# Patient Record
Sex: FEMALE | Race: WHITE
[De-identification: names, ages, dates, MRNs, and addresses within clinical notes are randomized per-mention and may not be internally consistent; named-entity substitution may affect disease eponyms.]

---

## 2017-09-13 ENCOUNTER — HOSPITAL ENCOUNTER (EMERGENCY)
Dept: HOSPITAL 25 - ED | Age: 82
Discharge: HOME | End: 2017-09-13
Payer: MEDICARE

## 2017-09-13 VITALS — SYSTOLIC BLOOD PRESSURE: 118 MMHG | DIASTOLIC BLOOD PRESSURE: 57 MMHG

## 2017-09-13 DIAGNOSIS — R05: ICD-10-CM

## 2017-09-13 DIAGNOSIS — F17.210: ICD-10-CM

## 2017-09-13 DIAGNOSIS — R06.2: ICD-10-CM

## 2017-09-13 DIAGNOSIS — J40: Primary | ICD-10-CM

## 2017-09-13 LAB
ALBUMIN SERPL BCG-MCNC: 3.5 G/DL (ref 3.2–5.2)
ALP SERPL-CCNC: 68 U/L (ref 34–104)
ALT SERPL W P-5'-P-CCNC: 11 U/L (ref 7–52)
ANION GAP SERPL CALC-SCNC: 7 MMOL/L (ref 2–11)
AST SERPL-CCNC: 12 U/L (ref 13–39)
BUN SERPL-MCNC: 16 MG/DL (ref 6–24)
BUN/CREAT SERPL: 22.9 (ref 8–20)
CALCIUM SERPL-MCNC: 9.3 MG/DL (ref 8.6–10.3)
CHLORIDE SERPL-SCNC: 100 MMOL/L (ref 101–111)
GLOBULIN SER CALC-MCNC: 3.5 G/DL (ref 2–4)
GLUCOSE SERPL-MCNC: 124 MG/DL (ref 70–100)
HCO3 SERPL-SCNC: 24 MMOL/L (ref 22–32)
HCT VFR BLD AUTO: 36 % (ref 35–47)
HGB BLD-MCNC: 11.5 G/DL (ref 12–16)
MCH RBC QN AUTO: 28 PG (ref 27–31)
MCHC RBC AUTO-ENTMCNC: 33 G/DL (ref 31–36)
MCV RBC AUTO: 86 FL (ref 80–97)
POTASSIUM SERPL-SCNC: 3.4 MMOL/L (ref 3.5–5)
PROT SERPL-MCNC: 7 G/DL (ref 6.4–8.9)
RBC # BLD AUTO: 4.12 10^6/UL (ref 4–5.4)
SODIUM SERPL-SCNC: 131 MMOL/L (ref 133–145)
TROPONIN I SERPL-MCNC: 0 NG/ML (ref ?–0.04)
WBC # BLD AUTO: 11.7 10^3/UL (ref 3.5–10.8)

## 2017-09-13 PROCEDURE — 83605 ASSAY OF LACTIC ACID: CPT

## 2017-09-13 PROCEDURE — 85025 COMPLETE CBC W/AUTO DIFF WBC: CPT

## 2017-09-13 PROCEDURE — 36415 COLL VENOUS BLD VENIPUNCTURE: CPT

## 2017-09-13 PROCEDURE — 84484 ASSAY OF TROPONIN QUANT: CPT

## 2017-09-13 PROCEDURE — 83880 ASSAY OF NATRIURETIC PEPTIDE: CPT

## 2017-09-13 PROCEDURE — 93005 ELECTROCARDIOGRAM TRACING: CPT

## 2017-09-13 PROCEDURE — 80053 COMPREHEN METABOLIC PANEL: CPT

## 2017-09-13 PROCEDURE — 99283 EMERGENCY DEPT VISIT LOW MDM: CPT

## 2017-09-13 PROCEDURE — 71010: CPT

## 2017-09-13 NOTE — RAD
INDICATION: Short of breath     



COMPARISON: September 14, 2014

 

TECHNIQUE: An AP portable view obtained at 2117 hours is submitted.



FINDINGS: 



Bones/Soft Tissues: There are no acute bony findings. There is a scoliotic deformity.



Cardiomediastinal: The cardiomediastinal silhouette is normal. 



Lungs: There is hyperinflation with mild chronic interstitial change.



Pleura: There are no pleural effusions. 



Other: None



IMPRESSION:  HYPERINFLATION WITH MILD CHRONIC INTERSTITIAL CHANGE. NO ACUTE FINDINGS.

## 2017-09-14 ENCOUNTER — HOSPITAL ENCOUNTER (INPATIENT)
Dept: HOSPITAL 25 - ED | Age: 82
LOS: 3 days | Discharge: HOME | DRG: 189 | End: 2017-09-17
Attending: HOSPITALIST | Admitting: HOSPITALIST
Payer: MEDICARE

## 2017-09-14 DIAGNOSIS — J18.9: ICD-10-CM

## 2017-09-14 DIAGNOSIS — Z82.8: ICD-10-CM

## 2017-09-14 DIAGNOSIS — F17.210: ICD-10-CM

## 2017-09-14 DIAGNOSIS — J44.1: ICD-10-CM

## 2017-09-14 DIAGNOSIS — J96.01: Primary | ICD-10-CM

## 2017-09-14 DIAGNOSIS — F41.9: ICD-10-CM

## 2017-09-14 DIAGNOSIS — Z82.49: ICD-10-CM

## 2017-09-14 DIAGNOSIS — Z79.899: ICD-10-CM

## 2017-09-14 DIAGNOSIS — G47.00: ICD-10-CM

## 2017-09-14 DIAGNOSIS — Z81.1: ICD-10-CM

## 2017-09-14 LAB
ALBUMIN SERPL BCG-MCNC: 3.3 G/DL (ref 3.2–5.2)
ALP SERPL-CCNC: 59 U/L (ref 34–104)
ALT SERPL W P-5'-P-CCNC: 12 U/L (ref 7–52)
ANION GAP SERPL CALC-SCNC: 9 MMOL/L (ref 2–11)
AST SERPL-CCNC: 14 U/L (ref 13–39)
BUN SERPL-MCNC: 15 MG/DL (ref 6–24)
BUN/CREAT SERPL: 20.8 (ref 8–20)
CALCIUM SERPL-MCNC: 8.6 MG/DL (ref 8.6–10.3)
CHLORIDE SERPL-SCNC: 101 MMOL/L (ref 101–111)
GLOBULIN SER CALC-MCNC: 3.4 G/DL (ref 2–4)
GLUCOSE SERPL-MCNC: 112 MG/DL (ref 70–100)
HCO3 SERPL-SCNC: 24 MMOL/L (ref 22–32)
HCT VFR BLD AUTO: 34 % (ref 35–47)
HGB BLD-MCNC: 11.1 G/DL (ref 12–16)
MCH RBC QN AUTO: 28 PG (ref 27–31)
MCHC RBC AUTO-ENTMCNC: 33 G/DL (ref 31–36)
MCV RBC AUTO: 87 FL (ref 80–97)
POTASSIUM SERPL-SCNC: 3.8 MMOL/L (ref 3.5–5)
PROT SERPL-MCNC: 6.7 G/DL (ref 6.4–8.9)
RBC # BLD AUTO: 3.91 10^6/UL (ref 4–5.4)
SODIUM SERPL-SCNC: 134 MMOL/L (ref 133–145)
TROPONIN I SERPL-MCNC: 0.01 NG/ML (ref ?–0.04)
WBC # BLD AUTO: 11.5 10^3/UL (ref 3.5–10.8)

## 2017-09-14 PROCEDURE — 71020: CPT

## 2017-09-14 PROCEDURE — 87040 BLOOD CULTURE FOR BACTERIA: CPT

## 2017-09-14 PROCEDURE — 87502 INFLUENZA DNA AMP PROBE: CPT

## 2017-09-14 PROCEDURE — 87899 AGENT NOS ASSAY W/OPTIC: CPT

## 2017-09-14 PROCEDURE — 83880 ASSAY OF NATRIURETIC PEPTIDE: CPT

## 2017-09-14 PROCEDURE — 90686 IIV4 VACC NO PRSV 0.5 ML IM: CPT

## 2017-09-14 PROCEDURE — 87070 CULTURE OTHR SPECIMN AEROBIC: CPT

## 2017-09-14 PROCEDURE — 83605 ASSAY OF LACTIC ACID: CPT

## 2017-09-14 PROCEDURE — 85730 THROMBOPLASTIN TIME PARTIAL: CPT

## 2017-09-14 PROCEDURE — 99406 BEHAV CHNG SMOKING 3-10 MIN: CPT

## 2017-09-14 PROCEDURE — 36415 COLL VENOUS BLD VENIPUNCTURE: CPT

## 2017-09-14 PROCEDURE — 94760 N-INVAS EAR/PLS OXIMETRY 1: CPT

## 2017-09-14 PROCEDURE — 80053 COMPREHEN METABOLIC PANEL: CPT

## 2017-09-14 PROCEDURE — 84484 ASSAY OF TROPONIN QUANT: CPT

## 2017-09-14 PROCEDURE — 94640 AIRWAY INHALATION TREATMENT: CPT

## 2017-09-14 PROCEDURE — 93005 ELECTROCARDIOGRAM TRACING: CPT

## 2017-09-14 PROCEDURE — 85610 PROTHROMBIN TIME: CPT

## 2017-09-14 PROCEDURE — 85025 COMPLETE CBC W/AUTO DIFF WBC: CPT

## 2017-09-14 PROCEDURE — 87205 SMEAR GRAM STAIN: CPT

## 2017-09-14 PROCEDURE — 71010: CPT

## 2017-09-14 RX ADMIN — SODIUM CHLORIDE ONE MLS/HR: 900 IRRIGANT IRRIGATION at 21:01

## 2017-09-14 RX ADMIN — SODIUM CHLORIDE ONE MLS/HR: 900 IRRIGANT IRRIGATION at 23:15

## 2017-09-14 NOTE — ED
Corey ZAMAN Thomas, scribed for Martell Shields MD on 09/13/17 at 2058 .





Shortness of Breath





- HPI Summary


HPI Summary: 





The patient is a 84 y/o F with a Hx of pulmonary problems who presents to the 

ED c/o SOB that began two days ago. She takes Symbicort and albuterol at home, 

which have not relieved her SOB. She additionally c/o a productive cough (mucus 

production) and weakness. She denies CP and pedal edema. She also takes Ambien 

at night. She sees Dr. Gonzales for her pulmonary problems. She says she quit 

smoking about a month ago although she [place] [facility] [place]   





- History of Current Complaint


Chief Complaint: EDShortnessOfBreath


Time Seen by Provider: 09/13/17 20:42


Hx Obtained From: Patient


Onset/Duration: Lasting Days - onset two days ago, Still Present


Timing: Constant


Current Severity: Moderate


Dyspnea At: Rest


Aggrevating Factors: Nothing


Alleviating Factors: Nothing


Associated Signs & Symptoms: Cough (Productive) - mucus production


Related History: Similar Episode - She has similar prior episodes of SOB.





- Allergy/Home Medications


Allergies/Adverse Reactions: 


 Allergies











Allergy/AdvReac Type Severity Reaction Status Date / Time


 


No Known Allergies Allergy   Verified 09/13/17 20:14














PMH/Surg Hx/FS Hx/Imm Hx


Previously Healthy: No


Endocrine/Hematology History: 


   Denies: Hx Diabetes


Cardiovascular History: 


   Denies: Hx Congestive Heart Failure, Hx Hypertension


Respiratory History: Reports: Other Respiratory Problems/Disorders - current 

SOB with exertion


 History: 


   Denies: Hx Renal Disease





- Surgical History


Surgery Procedure, Year, and Place: gallbladder removal, appy, hysterectomy





- Immunization History


Date of Tetanus Vaccine: Unknown


Infectious Disease History: No


Infectious Disease History: 


   Denies: Traveled Outside the [place] in Last 30 Days





- Family History


Known Family History: Positive: Other - POS: DM, [place]





- Social History


Alcohol Use: None


Substance Use Type: Reports: None


Smoking Status (MU): Heavy Every Day Tobacco Smoker





Review of Systems


Negative: Fever


Negative: Chest Pain


Positive: Shortness Of Breath - onset two days ago, unrelieved by Symbicort and 

albuterol


Negative: Edema - legs


All Other Systems Reviewed And Are Negative: Yes





Physical Exam


Triage Information Reviewed: Yes


Vital Signs On Initial Exam: 


 Initial Vitals











Temp Pulse Resp BP Pulse Ox


 


 97.9 F   118   36   122/48   93 


 


 09/13/17 20:12  09/13/17 20:12  09/13/17 20:12  09/13/17 20:12  09/13/17 20:12











Vital Signs Reviewed: Yes


Appearance: Positive: Well-Appearing, No Pain Distress, Well-Nourished


Skin: Positive: Warm, Skin Color Reflects Adequate Perfusion, Dry


Head/Face: Positive: Normal Head/Face Inspection


Eyes: Positive: Normal


ENT: Positive: Normal ENT inspection


Neck: Positive: Supple, Nontender


Respiratory/Lung Sounds: Positive: Breath Sounds Present, Other - There are 

crackles at the right base.


Cardiovascular: Positive: RRR


Abdomen Description: Positive: Nontender, Soft


Bowel Sounds: Positive: Present


Musculoskeletal: Positive: Normal


Neurological: Positive: Normal


Psychiatric: Positive: Normal, Affect/Mood Appropriate





Diagnostics





- Vital Signs


 Vital Signs











  Temp Pulse Resp BP Pulse Ox


 


 09/13/17 20:15  97.9 F  118  36  122/48  93


 


 09/13/17 20:12  97.9 F  118  36  122/48  93














- Laboratory


Lab Results: 


 Lab Results











  09/13/17 09/13/17 09/13/17 Range/Units





  21:10 21:10 21:10 


 


WBC   11.7 H   (3.5-10.8)  10^3/ul


 


RBC   4.12   (4.0-5.4)  10^6/ul


 


Hgb   11.5 L   (12.0-16.0)  g/dl


 


Hct   36   (35-47)  %


 


MCV   86   (80-97)  fL


 


MCH   28   (27-31)  pg


 


MCHC   33   (31-36)  g/dl


 


RDW   13   (10.5-15)  %


 


Plt Count   240   (150-450)  10^3/ul


 


MPV   8   (7.4-10.4)  um3


 


Neut % (Auto)   67.4   (38-83)  %


 


Lymph % (Auto)   17.7 L   (25-47)  %


 


Mono % (Auto)   13.0 H   (1-9)  %


 


Eos % (Auto)   1.5   (0-6)  %


 


Baso % (Auto)   0.4   (0-2)  %


 


Absolute Neuts (auto)   7.9 H   (1.5-7.7)  10^3/ul


 


Absolute Lymphs (auto)   2.1   (1.0-4.8)  10^3/ul


 


Absolute Monos (auto)   1.5 H   (0-0.8)  10^3/ul


 


Absolute Eos (auto)   0.2   (0-0.6)  10^3/ul


 


Absolute Basos (auto)   0.1   (0-0.2)  10^3/ul


 


Absolute Nucleated RBC   0   10^3/ul


 


Nucleated RBC %   0   


 


Sodium    131 L  (133-145)  mmol/L


 


Potassium    3.4 L  (3.5-5.0)  mmol/L


 


Chloride    100 L  (101-111)  mmol/L


 


Carbon Dioxide    24  (22-32)  mmol/L


 


Anion Gap    7  (2-11)  mmol/L


 


BUN    16  (6-24)  mg/dL


 


Creatinine    0.70  (0.51-0.95)  mg/dL


 


Est GFR ( Amer)    102.3  (>60)  


 


Est GFR (Non-Af Amer)    79.5  (>60)  


 


BUN/Creatinine Ratio    22.9 H  (8-20)  


 


Glucose    124 H  ()  mg/dL


 


Lactic Acid     (0.5-2.0)  mmol/L


 


Calcium    9.3  (8.6-10.3)  mg/dL


 


Total Bilirubin    0.50  (0.2-1.0)  mg/dL


 


AST    12 L  (13-39)  U/L


 


ALT    11  (7-52)  U/L


 


Alkaline Phosphatase    68  ()  U/L


 


Troponin I    0.00  (<0.04)  ng/mL


 


B-Natriuretic Peptide  54   ( - 100) pg/mL


 


Total Protein    7.0  (6.4-8.9)  g/dL


 


Albumin    3.5  (3.2-5.2)  g/dL


 


Globulin    3.5  (2-4)  g/dL


 


Albumin/Globulin Ratio    1.0  (1-3)  














  09/13/17 Range/Units





  21:10 


 


WBC   (3.5-10.8)  10^3/ul


 


RBC   (4.0-5.4)  10^6/ul


 


Hgb   (12.0-16.0)  g/dl


 


Hct   (35-47)  %


 


MCV   (80-97)  fL


 


MCH   (27-31)  pg


 


MCHC   (31-36)  g/dl


 


RDW   (10.5-15)  %


 


Plt Count   (150-450)  10^3/ul


 


MPV   (7.4-10.4)  um3


 


Neut % (Auto)   (38-83)  %


 


Lymph % (Auto)   (25-47)  %


 


Mono % (Auto)   (1-9)  %


 


Eos % (Auto)   (0-6)  %


 


Baso % (Auto)   (0-2)  %


 


Absolute Neuts (auto)   (1.5-7.7)  10^3/ul


 


Absolute Lymphs (auto)   (1.0-4.8)  10^3/ul


 


Absolute Monos (auto)   (0-0.8)  10^3/ul


 


Absolute Eos (auto)   (0-0.6)  10^3/ul


 


Absolute Basos (auto)   (0-0.2)  10^3/ul


 


Absolute Nucleated RBC   10^3/ul


 


Nucleated RBC %   


 


Sodium   (133-145)  mmol/L


 


Potassium   (3.5-5.0)  mmol/L


 


Chloride   (101-111)  mmol/L


 


Carbon Dioxide   (22-32)  mmol/L


 


Anion Gap   (2-11)  mmol/L


 


BUN   (6-24)  mg/dL


 


Creatinine   (0.51-0.95)  mg/dL


 


Est GFR ( Amer)   (>60)  


 


Est GFR (Non-Af Amer)   (>60)  


 


BUN/Creatinine Ratio   (8-20)  


 


Glucose   ()  mg/dL


 


Lactic Acid  1.2  (0.5-2.0)  mmol/L


 


Calcium   (8.6-10.3)  mg/dL


 


Total Bilirubin   (0.2-1.0)  mg/dL


 


AST   (13-39)  U/L


 


ALT   (7-52)  U/L


 


Alkaline Phosphatase   ()  U/L


 


Troponin I   (<0.04)  ng/mL


 


B-Natriuretic Peptide  ( - 100) pg/mL


 


Total Protein   (6.4-8.9)  g/dL


 


Albumin   (3.2-5.2)  g/dL


 


Globulin   (2-4)  g/dL


 


Albumin/Globulin Ratio   (1-3)  











Result Diagrams: 


 09/13/17 21:10





 09/13/17 21:10


Lab Statement: Any lab studies that have been ordered have been reviewed, and 

results considered in the medical decision making process.





- EKG


  ** 20:22


Cardiac Rate: Tachycardia - 100 BPM


EKG Interpretation: Sinus tachycardia. 





Course/Dx





- Course


Course Of Treatment: Ms Hinojosa clearly has pulmonary problems.  She is being W/

U'd and taken care of by .  She is a smoker.  She uses COPD meds.  

When she arrived, Her SPO2 was marginal, she was tachycardic and she was mildly 

tachypneic. I  heard no wheezes although she had just had a neb in the EMS.  I 

did hear a lot of upper airway sounds and crackles in her right base. A CXR was 

read as no acute pathology by the radiologist although I wasn't sure about the 

RLL. I wanted to treat her more aggressively with steroids and admission to the 

hospital but she wanted to go home as she felt better. She was still in the low 

90's for SPO2 and a little tachypneic at about 20.  She was no longer 

tachycardic.  She may have bronchiectasis and I am going to cover her with 

levaquin and encourage return for worsening and close F/U.





- Diagnoses


Provider Diagnoses: 


 Bronchitis








Discharge





- Discharge Plan


Condition: Stable


Disposition: HOME


Prescriptions: 


Levofloxacin TAB* [Levaquin TAB*] 750 mg PO DAILY #10 tab


Patient Education Materials:  Acute Bronchitis (ED)


Referrals: 


Mariela Cobb MD [Primary Care Provider] - 4 Days


Additional Instructions: 


Follow up with your primary care provider within a 4-5 days.  





Return to the emergency department for any new or worsening symptoms. 





The documentation as recorded by the Corey hollis Thomas accurately reflects 

the service I personally performed and the decisions made by me, Martell Shields MD.

## 2017-09-14 NOTE — RAD
INDICATION: Pneumonia     



COMPARISON: September 13, 2017

 

TECHNIQUE: PA and lateral dual-energy views were obtained.



FINDINGS: 



Bones/Soft Tissues: There are no acute bony findings.    



Cardiomediastinal: The cardiomediastinal silhouette is normal. 



Lungs: There is a small right basal infiltrate. The remaining lung fields are clear. There

is mild hyperinflation.



Pleura: There are no pleural effusions. 



Other: None



IMPRESSION:  SMALL RIGHT BASAL INFILTRATE.

## 2017-09-15 LAB
HCT VFR BLD AUTO: 34 % (ref 35–47)
HGB BLD-MCNC: 11.1 G/DL (ref 12–16)
MCH RBC QN AUTO: 29 PG (ref 27–31)
MCHC RBC AUTO-ENTMCNC: 33 G/DL (ref 31–36)
MCV RBC AUTO: 87 FL (ref 80–97)
RBC # BLD AUTO: 3.89 10^6/UL (ref 4–5.4)
WBC # BLD AUTO: 7.4 10^3/UL (ref 3.5–10.8)

## 2017-09-15 RX ADMIN — GUAIFENESIN SCH MG: 600 TABLET, EXTENDED RELEASE ORAL at 10:11

## 2017-09-15 RX ADMIN — ALBUTEROL SULFATE SCH: 2.5 SOLUTION RESPIRATORY (INHALATION) at 15:54

## 2017-09-15 RX ADMIN — MOMETASONE FUROATE AND FORMOTEROL FUMARATE DIHYDRATE SCH PUFF: 200; 5 AEROSOL RESPIRATORY (INHALATION) at 20:07

## 2017-09-15 RX ADMIN — ZOLPIDEM TARTRATE PRN MG: 10 TABLET, FILM COATED ORAL at 21:44

## 2017-09-15 RX ADMIN — DOCUSATE SODIUM SCH MG: 100 CAPSULE, LIQUID FILLED ORAL at 20:13

## 2017-09-15 RX ADMIN — OMEPRAZOLE SCH MG: 20 CAPSULE, DELAYED RELEASE ORAL at 06:15

## 2017-09-15 RX ADMIN — SODIUM CHLORIDE SCH MLS/HR: 900 IRRIGANT IRRIGATION at 05:30

## 2017-09-15 RX ADMIN — LEVOFLOXACIN SCH MLS/HR: 5 INJECTION, SOLUTION INTRAVENOUS at 06:14

## 2017-09-15 RX ADMIN — ALPRAZOLAM PRN MG: 0.25 TABLET ORAL at 17:50

## 2017-09-15 RX ADMIN — TIOTROPIUM BROMIDE SCH CAP: 18 CAPSULE ORAL; RESPIRATORY (INHALATION) at 08:39

## 2017-09-15 RX ADMIN — MOMETASONE FUROATE AND FORMOTEROL FUMARATE DIHYDRATE SCH PUFF: 200; 5 AEROSOL RESPIRATORY (INHALATION) at 08:39

## 2017-09-15 RX ADMIN — SODIUM CHLORIDE SCH MLS/HR: 900 IRRIGANT IRRIGATION at 20:20

## 2017-09-15 RX ADMIN — ALBUTEROL SULFATE SCH MG: 2.5 SOLUTION RESPIRATORY (INHALATION) at 08:38

## 2017-09-15 RX ADMIN — DOCUSATE SODIUM SCH MG: 100 CAPSULE, LIQUID FILLED ORAL at 10:12

## 2017-09-15 RX ADMIN — GUAIFENESIN SCH MG: 600 TABLET, EXTENDED RELEASE ORAL at 20:13

## 2017-09-15 NOTE — ED
Osmin ZAMAN Angela, scribed for Yolanda Sanchez MD on 09/14/17 at 2028 .





Shortness of Breath





- HPI Summary


HPI Summary: 





This pt is a 86 y/[facility] [facility] and SOB x2 

days ago, and now has increased SOB. Pt was in the ED yesterday and was given 

antibiotics for pneumonia. Pt states she has taken the Clarithromycin and 

Levaquin as prescribed, but she feels worse. Pt is a current smoker.





- History of Current Complaint


Chief Complaint: EDShortnessOfBreath


Time Seen by Provider: 09/14/17 20:06


Hx Obtained From: Patient, Family/Caretaker - 


Onset/Duration: Lasting Days


Timing: Constant


Dyspnea At: Exertion


Aggrevating Factors: Nothing


Alleviating Factors: Nothing





- Allergy/Home Medications


Allergies/Adverse Reactions: 


 Allergies











Allergy/AdvReac Type Severity Reaction Status Date / Time


 


No Known Allergies Allergy   Verified 09/14/17 20:42











Home Medications: 


 Home Medications





Clarithromycin TAB*  09/14/17 [History]











PMH/Surg Hx/FS Hx/Imm Hx


Endocrine/Hematology History: 


   Denies: Hx Diabetes


Cardiovascular History: 


   Denies: Hx Congestive Heart Failure, Hx Hypertension


Respiratory History: Reports: Other Respiratory Problems/Disorders - current 

SOB with exertion


 History: 


   Denies: Hx Renal Disease





- Surgical History


Surgery Procedure, Year, and Place: gallbladder removal, appy, hysterectomy





- Immunization History


Date of Tetanus Vaccine: Unknown


Infectious Disease History: No


Infectious Disease History: 


   Denies: Traveled Outside the [place] in Last 30 Days





- Family History


Known Family History: Positive: Other - POS: DM, [place]


   Negative: Hypertension





- Social History


Lives: With Family - 


Alcohol Use: None


Substance Use Type: Reports: None


Smoking Status (MU): Heavy Every Day Tobacco Smoker





Review of Systems


Negative: Fever, Chills


Eyes: Negative


ENT: Negative


Negative: Palpitations, Chest Pain


Positive: Shortness Of Breath


Gastrointestinal: Negative


Genitourinary: Negative


Musculoskeletal: Negative


Skin: Negative


Neurological: Negative


All Other Systems Reviewed And Are Negative: Yes





Physical Exam


Triage Information Reviewed: Yes


Vital Signs On Initial Exam: 


 Initial Vitals











Temp Pulse Resp BP Pulse Ox


 


 100 F   116   22   123/60   92 


 


 09/14/17 18:22  09/14/17 18:22  09/14/17 18:22  09/14/17 18:22  09/14/17 18:22











Vital Signs Reviewed: Yes


Appearance: Positive: No Pain Distress, Ill-Appearing


Skin: Positive: Warm, Skin Color Reflects Adequate Perfusion, Dry


Eyes: Positive: EOMI, BA


ENT: Positive: Pharynx normal, TMs normal


Neck: Positive: Supple, Nontender


Respiratory/Lung Sounds: Positive: Rhonchi - at the bases, Other - tachypnic 

but not in respiratory failure


Cardiovascular: Positive: RRR.  Negative: Murmur, Rub, Other - gallop


Abdomen Description: Positive: Nontender, Soft


Bowel Sounds: Positive: Present


Musculoskeletal: Positive: Strength/ROM Intact.  Negative: Edema Left, Edema 

Right


Neurological: Positive: Sensory/Motor Intact, Alert, Oriented to Person Place, 

Time, CN Intact II-III


Psychiatric: Positive: Affect/Mood Appropriate





Diagnostics





- Vital Signs


 Vital Signs











  Temp Pulse Resp BP Pulse Ox


 


 09/14/17 20:16  98.9 F  94  20  101/57  94


 


 09/14/17 19:12  101.6 F  111  18  107/57  94


 


 09/14/17 18:22  100 F  116  22  123/60  92














- Laboratory


Lab Results: 


 Lab Results











  09/14/17 09/14/17 09/14/17 Range/Units





  21:31 21:31 21:31 


 


WBC   11.5 H   (3.5-10.8)  10^3/ul


 


RBC   3.91 L   (4.0-5.4)  10^6/ul


 


Hgb   11.1 L   (12.0-16.0)  g/dl


 


Hct   34 L   (35-47)  %


 


MCV   87   (80-97)  fL


 


MCH   28   (27-31)  pg


 


MCHC   33   (31-36)  g/dl


 


RDW   13   (10.5-15)  %


 


Plt Count   231   (150-450)  10^3/ul


 


MPV   8   (7.4-10.4)  um3


 


Neut % (Auto)   71.1   (38-83)  %


 


Lymph % (Auto)   16.5 L   (25-47)  %


 


Mono % (Auto)   12.0 H   (1-9)  %


 


Eos % (Auto)   0.2   (0-6)  %


 


Baso % (Auto)   0.2   (0-2)  %


 


Absolute Neuts (auto)   8.2 H   (1.5-7.7)  10^3/ul


 


Absolute Lymphs (auto)   1.9   (1.0-4.8)  10^3/ul


 


Absolute Monos (auto)   1.4 H   (0-0.8)  10^3/ul


 


Absolute Eos (auto)   0   (0-0.6)  10^3/ul


 


Absolute Basos (auto)   0   (0-0.2)  10^3/ul


 


Absolute Nucleated RBC   0.01   10^3/ul


 


Nucleated RBC %   0   


 


INR (Anticoag Therapy)  1.43 H    (0.89-1.11)  


 


APTT  29.9    (26.0-36.3)  seconds


 


Sodium    134  (133-145)  mmol/L


 


Potassium    3.8  (3.5-5.0)  mmol/L


 


Chloride    101  (101-111)  mmol/L


 


Carbon Dioxide    24  (22-32)  mmol/L


 


Anion Gap    9  (2-11)  mmol/L


 


BUN    15  (6-24)  mg/dL


 


Creatinine    0.72  (0.51-0.95)  mg/dL


 


Est GFR ( Amer)    99.0  (>60)  


 


Est GFR (Non-Af Amer)    77.0  (>60)  


 


BUN/Creatinine Ratio    20.8 H  (8-20)  


 


Glucose    112 H  ()  mg/dL


 


Lactic Acid     (0.5-2.0)  mmol/L


 


Calcium    8.6  (8.6-10.3)  mg/dL


 


Total Bilirubin    0.60  (0.2-1.0)  mg/dL


 


AST    14  (13-39)  U/L


 


ALT    12  (7-52)  U/L


 


Alkaline Phosphatase    59  ()  U/L


 


Troponin I    0.01  (<0.04)  ng/mL


 


Total Protein    6.7  (6.4-8.9)  g/dL


 


Albumin    3.3  (3.2-5.2)  g/dL


 


Globulin    3.4  (2-4)  g/dL


 


Albumin/Globulin Ratio    1.0  (1-3)  














  09/14/17 Range/Units





  21:31 


 


WBC   (3.5-10.8)  10^3/ul


 


RBC   (4.0-5.4)  10^6/ul


 


Hgb   (12.0-16.0)  g/dl


 


Hct   (35-47)  %


 


MCV   (80-97)  fL


 


MCH   (27-31)  pg


 


MCHC   (31-36)  g/dl


 


RDW   (10.5-15)  %


 


Plt Count   (150-450)  10^3/ul


 


MPV   (7.4-10.4)  um3


 


Neut % (Auto)   (38-83)  %


 


Lymph % (Auto)   (25-47)  %


 


Mono % (Auto)   (1-9)  %


 


Eos % (Auto)   (0-6)  %


 


Baso % (Auto)   (0-2)  %


 


Absolute Neuts (auto)   (1.5-7.7)  10^3/ul


 


Absolute Lymphs (auto)   (1.0-4.8)  10^3/ul


 


Absolute Monos (auto)   (0-0.8)  10^3/ul


 


Absolute Eos (auto)   (0-0.6)  10^3/ul


 


Absolute Basos (auto)   (0-0.2)  10^3/ul


 


Absolute Nucleated RBC   10^3/ul


 


Nucleated RBC %   


 


INR (Anticoag Therapy)   (0.89-1.11)  


 


APTT   (26.0-36.3)  seconds


 


Sodium   (133-145)  mmol/L


 


Potassium   (3.5-5.0)  mmol/L


 


Chloride   (101-111)  mmol/L


 


Carbon Dioxide   (22-32)  mmol/L


 


Anion Gap   (2-11)  mmol/L


 


BUN   (6-24)  mg/dL


 


Creatinine   (0.51-0.95)  mg/dL


 


Est GFR ( Amer)   (>60)  


 


Est GFR (Non-Af Amer)   (>60)  


 


BUN/Creatinine Ratio   (8-20)  


 


Glucose   ()  mg/dL


 


Lactic Acid  1.4  (0.5-2.0)  mmol/L


 


Calcium   (8.6-10.3)  mg/dL


 


Total Bilirubin   (0.2-1.0)  mg/dL


 


AST   (13-39)  U/L


 


ALT   (7-52)  U/L


 


Alkaline Phosphatase   ()  U/L


 


Troponin I   (<0.04)  ng/mL


 


Total Protein   (6.4-8.9)  g/dL


 


Albumin   (3.2-5.2)  g/dL


 


Globulin   (2-4)  g/dL


 


Albumin/Globulin Ratio   (1-3)  











Result Diagrams: 


 09/14/17 21:31





 09/14/17 21:31


Lab Statement: Any lab studies that have been ordered have been reviewed, and 

results considered in the medical decision making process.





- Radiology


  ** Chest XR


Xray Interpretation: Positive (See Comments) - IMPRESSION: small right basal 

infiltrate. ED physician has reviewed this radiology report and agrees.


Radiology Interpretation Completed By: Radiologist





- EKG


  ** 2035


Cardiac Rate: NL


EKG Rhythm: Sinus Rhythm


EKG Comparison: No Significant Change - No change compared to yesterday's (9/13/ 16) EKG.





Course/Dx





- Course


Assessment/Plan: pt seen on Wed with likely pneumonia pt wanted to go home 

despite offering admission and is back today with worsened shortness of breath. 

cxr today does show an infiltrate,case was discussed with Frankenberg and she 

was accepted for admission





- Diagnoses


Differential Diagnosis/HQI/PQRI: Positive: Pneumonia


Provider Diagnoses: 


 Pneumonia, COPD (chronic obstructive pulmonary disease)








- Physician Notifications


Discussed Care of Patient With: Fred Frankenberg


Time Discussed With Above Provider: 20:40


Instructed by Provider To: Other - I discussed the pt's case with Dr. Frankenberg. He has agreed to admit the pt.





Discharge





- Discharge Plan


Condition: Stable


Disposition: ADMITTED TO [facility]


Referrals: 


Mariela Cobb MD [Primary Care Provider] - 





The documentation as recorded by the Osmin hollis Angela accurately reflects 

the service I personally performed and the decisions made by me, Yolanda Sanchez MD.

## 2017-09-15 NOTE — PN
Hospitalist Progress Note


HOSPITALIST ADDENDUM


Mrs. Hinojosa is an 86yo F with PMH of tobacco abuse and probable COPD, who 

presented to ED with c/o dyspnea and cough, found to have RLL pneumonia.


She offers no new complaints.


Agree with current management.

## 2017-09-15 NOTE — HP
H&P (Free Text)


History and Physical: 





PCP: ANTONINO Cobb MD





Date/Time: 09/15/2017 0300





CC: SOB, cough





HPI: Mrs Hinojosa is an 86YO [place] [facility] for SOB & cough 

with a negative evaluation and released with a prescription for clarithromycin 

& levofloxacin for suspected pneumnonia which she reports taking, but feels 

worse. She returns today for ongoing SOB & cough. She also has decreased 

appetite, generalized weakness, & malaise. She denies chest pain, N/V, 

palpitations, LE edema, or other issues. Repeat CXR today shows interval 

development of a RLL infiltrate.





PMedHx


insomnia





Ambulatory Orders


Zolpidem TAB* [Ambien*] 10 mg PO BEDTIME 08/07/14 


Clarithromycin TAB*  09/14/17 


Levofloxacin TAB* [Levaquin TAB*] 750 mg PO DAILY #10 tab 09/14/17 





Allergies


No Known Allergies Allergy (Verified 09/14/17 20:42)





PSurgHx


OU cataract extractions


cholecystectomy


appendectomy


hysterectomy





SocHx: 1PPD tobacco, no alcohol, no recreational drugs;  x2, lives with 

her son; full code status





FamHx: Mother passed at 87 2nd Alzheimer's. Father passed at 79 2nd 

complications of alcoholism. Siblings: pacer placement





ROS: as above, otherwise reviewed and all were negative





vitals: 


 Vital Signs











Temp  36.5 C   09/15/17 03:00


 


Pulse  73   09/15/17 03:03


 


Resp  29   09/15/17 03:03


 


BP  107/60   09/15/17 03:03


 


Pulse Ox  94   09/15/17 03:03








 Intake & Output











 09/14/17 09/14/17 09/15/17





 11:59 23:59 11:59


 


Intake Total   4000


 


Balance   4000


 


Weight  68.039 kg 


 


Intake:   


 


  IV Fluids   4000








Constitutional: NAD, normally developed, overweight elderly white female


HEENM: atraumatic; sclera/conjunctiva: non-icteric/clear; hearing: clinically 

mild/mod decreased; oropharynx: clear, mucosa moist


Neck: soft tissue: non-tender; thyroid: normal


Pulmonary: R basal crackle, B mid- to end- expiratory wheeze with prolonged 

expiratory phase; mildly diminished B, fair to good aeration, no accessory 

muscle use


CV: RR/RR, normal S1S2, no carotid bruit, no jugular venous distention, 2+ B DP/

PT, no edema


Abdominal: soft, non-distended, non-tender, no rebound/guarding/rigidity, 

normoactive bowel sounds, no hepatosplenomegaly or masses, no costovertebral 

angle tenderness


Musculoskeletal: general: grossly intact, no palpable tenderness


Integumental: normal appearance and texture of exposed skin


Psychiatric 


orientation: AA&O to PPS


affect: mildly anxious


mood: cooperative


eye contact: good


content: reliable


responses: timely


insight: fair





Testing: 


 Lab Results











  09/14/17 09/14/17 09/14/17 Range/Units





  21:31 21:31 21:31 


 


WBC   11.5 H   (3.5-10.8)  10^3/ul


 


RBC   3.91 L   (4.0-5.4)  10^6/ul


 


Hgb   11.1 L   (12.0-16.0)  g/dl


 


Hct   34 L   (35-47)  %


 


MCV   87   (80-97)  fL


 


MCH   28   (27-31)  pg


 


MCHC   33   (31-36)  g/dl


 


RDW   13   (10.5-15)  %


 


Plt Count   231   (150-450)  10^3/ul


 


MPV   8   (7.4-10.4)  um3


 


Neut % (Auto)   71.1   (38-83)  %


 


Lymph % (Auto)   16.5 L   (25-47)  %


 


Mono % (Auto)   12.0 H   (1-9)  %


 


Eos % (Auto)   0.2   (0-6)  %


 


Baso % (Auto)   0.2   (0-2)  %


 


Absolute Neuts (auto)   8.2 H   (1.5-7.7)  10^3/ul


 


Absolute Lymphs (auto)   1.9   (1.0-4.8)  10^3/ul


 


Absolute Monos (auto)   1.4 H   (0-0.8)  10^3/ul


 


Absolute Eos (auto)   0   (0-0.6)  10^3/ul


 


Absolute Basos (auto)   0   (0-0.2)  10^3/ul


 


Absolute Nucleated RBC   0.01   10^3/ul


 


Nucleated RBC %   0   


 


INR (Anticoag Therapy)  1.43 H    (0.89-1.11)  


 


APTT  29.9    (26.0-36.3)  seconds


 


Sodium    134  (133-145)  mmol/L


 


Potassium    3.8  (3.5-5.0)  mmol/L


 


Chloride    101  (101-111)  mmol/L


 


Carbon Dioxide    24  (22-32)  mmol/L


 


Anion Gap    9  (2-11)  mmol/L


 


BUN    15  (6-24)  mg/dL


 


Creatinine    0.72  (0.51-0.95)  mg/dL


 


Est GFR ( Amer)    99.0  (>60)  


 


Est GFR (Non-Af Amer)    77.0  (>60)  


 


BUN/Creatinine Ratio    20.8 H  (8-20)  


 


Glucose    112 H  ()  mg/dL


 


Lactic Acid     (0.5-2.0)  mmol/L


 


Calcium    8.6  (8.6-10.3)  mg/dL


 


Total Bilirubin    0.60  (0.2-1.0)  mg/dL


 


AST    14  (13-39)  U/L


 


ALT    12  (7-52)  U/L


 


Alkaline Phosphatase    59  ()  U/L


 


Troponin I    0.01  (<0.04)  ng/mL


 


Total Protein    6.7  (6.4-8.9)  g/dL


 


Albumin    3.3  (3.2-5.2)  g/dL


 


Globulin    3.4  (2-4)  g/dL


 


Albumin/Globulin Ratio    1.0  (1-3)  














  09/14/17 Range/Units





  21:31 


 


WBC   (3.5-10.8)  10^3/ul


 


RBC   (4.0-5.4)  10^6/ul


 


Hgb   (12.0-16.0)  g/dl


 


Hct   (35-47)  %


 


MCV   (80-97)  fL


 


MCH   (27-31)  pg


 


MCHC   (31-36)  g/dl


 


RDW   (10.5-15)  %


 


Plt Count   (150-450)  10^3/ul


 


MPV   (7.4-10.4)  um3


 


Neut % (Auto)   (38-83)  %


 


Lymph % (Auto)   (25-47)  %


 


Mono % (Auto)   (1-9)  %


 


Eos % (Auto)   (0-6)  %


 


Baso % (Auto)   (0-2)  %


 


Absolute Neuts (auto)   (1.5-7.7)  10^3/ul


 


Absolute Lymphs (auto)   (1.0-4.8)  10^3/ul


 


Absolute Monos (auto)   (0-0.8)  10^3/ul


 


Absolute Eos (auto)   (0-0.6)  10^3/ul


 


Absolute Basos (auto)   (0-0.2)  10^3/ul


 


Absolute Nucleated RBC   10^3/ul


 


Nucleated RBC %   


 


INR (Anticoag Therapy)   (0.89-1.11)  


 


APTT   (26.0-36.3)  seconds


 


Sodium   (133-145)  mmol/L


 


Potassium   (3.5-5.0)  mmol/L


 


Chloride   (101-111)  mmol/L


 


Carbon Dioxide   (22-32)  mmol/L


 


Anion Gap   (2-11)  mmol/L


 


BUN   (6-24)  mg/dL


 


Creatinine   (0.51-0.95)  mg/dL


 


Est GFR ( Amer)   (>60)  


 


Est GFR (Non-Af Amer)   (>60)  


 


BUN/Creatinine Ratio   (8-20)  


 


Glucose   ()  mg/dL


 


Lactic Acid  1.4  (0.5-2.0)  mmol/L


 


Calcium   (8.6-10.3)  mg/dL


 


Total Bilirubin   (0.2-1.0)  mg/dL


 


AST   (13-39)  U/L


 


ALT   (7-52)  U/L


 


Alkaline Phosphatase   ()  U/L


 


Troponin I   (<0.04)  ng/mL


 


Total Protein   (6.4-8.9)  g/dL


 


Albumin   (3.2-5.2)  g/dL


 


Globulin   (2-4)  g/dL


 


Albumin/Globulin Ratio   (1-3)  








ECG, personally reviewed: NSR rate 93, no ischemia





CXR, personally reviewed: IMPRESSION:  SMALL RIGHT BASAL INFILTRATE.





Impression: 85F presenting with RLL pneumonia





DIAGNOSIS & PLAN


Primary 


RLL pneumonia


: took PO levofloxacin 09/14


: IV levofloxacin starting this AM


: IVFs


: blood & sputum CXs


: urine S pneumo & Legionella antigens


: supplemental oxygen


: guaifenesin


: incentive spirometry


: supportive care





COPD exacerbation


: albuterol nebs


: mometasone/formoterol


: tiotropium


: IV methylprednisolone


: smoking cessation advised, moderate motivation





Secondary 


insomnia


: continue HS zolpidem





Admission Rational: inpatient for pneumonia requiring IV ABX and IVFs in an 

elderly patient at risk of rapid/terminal decompensation making outpatient 

setting inappropriate


DVTp: heparin SQ & SCDs


Code Status: full


HCP: son

## 2017-09-16 RX ADMIN — GUAIFENESIN SCH MG: 600 TABLET, EXTENDED RELEASE ORAL at 21:10

## 2017-09-16 RX ADMIN — TIOTROPIUM BROMIDE SCH CAP: 18 CAPSULE ORAL; RESPIRATORY (INHALATION) at 08:51

## 2017-09-16 RX ADMIN — OMEPRAZOLE SCH MG: 20 CAPSULE, DELAYED RELEASE ORAL at 05:58

## 2017-09-16 RX ADMIN — ALPRAZOLAM PRN MG: 0.25 TABLET ORAL at 10:09

## 2017-09-16 RX ADMIN — LEVOFLOXACIN SCH MLS/HR: 5 INJECTION, SOLUTION INTRAVENOUS at 05:56

## 2017-09-16 RX ADMIN — MOMETASONE FUROATE AND FORMOTEROL FUMARATE DIHYDRATE SCH PUFF: 200; 5 AEROSOL RESPIRATORY (INHALATION) at 08:52

## 2017-09-16 RX ADMIN — HEPARIN SODIUM SCH UNITS: 5000 INJECTION INTRAVENOUS; SUBCUTANEOUS at 15:00

## 2017-09-16 RX ADMIN — METHYLPREDNISOLONE SODIUM SUCCINATE SCH MG: 40 INJECTION, POWDER, FOR SOLUTION INTRAMUSCULAR; INTRAVENOUS at 12:38

## 2017-09-16 RX ADMIN — MOMETASONE FUROATE AND FORMOTEROL FUMARATE DIHYDRATE SCH PUFF: 200; 5 AEROSOL RESPIRATORY (INHALATION) at 21:31

## 2017-09-16 RX ADMIN — HEPARIN SODIUM SCH UNITS: 5000 INJECTION INTRAVENOUS; SUBCUTANEOUS at 21:24

## 2017-09-16 RX ADMIN — HEPARIN SODIUM SCH UNITS: 5000 INJECTION INTRAVENOUS; SUBCUTANEOUS at 05:58

## 2017-09-16 RX ADMIN — METHYLPREDNISOLONE SODIUM SUCCINATE SCH MG: 40 INJECTION, POWDER, FOR SOLUTION INTRAMUSCULAR; INTRAVENOUS at 21:13

## 2017-09-16 RX ADMIN — ZOLPIDEM TARTRATE PRN MG: 10 TABLET, FILM COATED ORAL at 21:10

## 2017-09-16 RX ADMIN — GUAIFENESIN SCH MG: 600 TABLET, EXTENDED RELEASE ORAL at 10:09

## 2017-09-16 RX ADMIN — DOCUSATE SODIUM SCH MG: 100 CAPSULE, LIQUID FILLED ORAL at 21:10

## 2017-09-16 RX ADMIN — METHYLPREDNISOLONE SODIUM SUCCINATE SCH MG: 40 INJECTION, POWDER, FOR SOLUTION INTRAMUSCULAR; INTRAVENOUS at 04:41

## 2017-09-16 RX ADMIN — DOCUSATE SODIUM SCH MG: 100 CAPSULE, LIQUID FILLED ORAL at 10:09

## 2017-09-16 NOTE — PN
Subjective


Date of Service: 09/16/17


Interval History: 


HOSPITALIST PROGRESS NOTE


Patient seen and examined at bedside.


She feels better today. Dyspnea and cough are still present, but less intense. 

Appetite is poor, but she states this is her baseline.


Family History: Unchanged from Admission


Social History: Unchanged from Admission


Past Medical History: Unchanged from Admission





Objective


Active Medications: 








Acetaminophen (Tylenol Tab*)  650 mg PO Q6H PRN


   PRN Reason: FEVER/PAIN


Albuterol (Ventolin 2.5 Mg/3 Ml Neb.Sol*)  2.5 mg INH Q2H PRN


   PRN Reason: SOB/WHEEZING


   Last Admin: 09/16/17 05:22 Dose:  2.5 mg


Alprazolam (Xanax Tab*)  0.25 mg PO Q8H PRN


   PRN Reason: ANXIETY


   Last Admin: 09/16/17 10:09 Dose:  0.25 mg


Docusate Sodium (Colace Cap*)  200 mg PO BID [facility]


   Last Admin: 09/16/17 10:09 Dose:  200 mg


Guaifenesin (Mucinex*)  1,200 mg PO BID [facility]


   Last Admin: 09/16/17 10:09 Dose:  1,200 mg


Heparin Sodium (Porcine) (Heparin Vial(*))  5,000 units SUBCUT Q8HR [facility]


   Last Admin: 09/16/17 05:58 Dose:  5,000 units


Levofloxacin/Dextrose (Levaquin 750 Mg Ivpremix(*))  750 mg in 150 mls @ 100 mls

/hr IVPB Q24H [facility]


   Last Admin: 09/16/17 05:56 Dose:  100 mls/hr


Melatonin (Melatonin (Nf))  3 mg PO BEDTIME PRN; Protocol


   PRN Reason: Sleep


Methylprednisolone Sodium Succinate (Solu-Medrol 40 Mg)  40 mg IV Q8H [facility]


   Last Admin: 09/16/17 12:38 Dose:  40 mg


Mometasone Furoate/Formoterol Fumar (Dulera 200/5 Mdi*)  2 puff INH BID [facility]


   Last Admin: 09/16/17 08:52 Dose:  2 puff


Nicotine (Nicotine Inhaler*)  10 mg INH Q2H PRN


   PRN Reason: CRAVING


Omeprazole (Prilosec Cap*)  20 mg PO DAILY@0600 [facility]


   Last Admin: 09/16/17 05:58 Dose:  20 mg


Ondansetron HCl (Zofran Inj*)  4 mg IV Q6H PRN


   PRN Reason: NAUSEA


Tiotropium Bromide (Spiriva Cap.Inh*)  1 cap INH DAILY DELTA


   Last Admin: 09/16/17 08:51 Dose:  1 cap


Zolpidem Tartrate (Ambien Tab*)  10 mg PO BEDTIME PRN


   PRN Reason: SLEEP


   Last Admin: 09/15/17 21:44 Dose:  10 mg








Vital Signs











  09/16/17 09/16/17 09/16/17





  11:40 11:43 12:09


 


Temperature  98.6 F 


 


Pulse Rate  76 


 


Respiratory  18 18





Rate   


 


Blood Pressure  125/53 





(mmHg)   


 


O2 Sat by Pulse 95 95 





Oximetry   








Oxygen Devices in Use Now: Nasal Cannula


Appearance: Elderly lady sitting up in bed in NAD.


Eyes: No Scleral Icterus


Ears/Nose/Mouth/Throat: Mucous Membranes Moist


Neck: Trachea Midline


Respiratory: Symmetrical Chest Expansion and Respiratory Effort, - - BS+ 

bilaterally decreased with no added sounds


Cardiovascular: RRR - Normal S1 and S2


Abdominal: NL Sounds; No Tenderness; No Distention


Neurological: Alert and Oriented x 3, NL Muscle Strength and Tone


Lines/Tubes/Other Access: Clean, Dry and Intact Peripheral IV


Nutrition: Taking PO's


Result Diagrams: 


 09/15/17 06:19





 09/14/17 21:31





Assess/Plan/Problems-Billing


Assessment: 


Mrs. Hinojosa is an 86yo F with PMH of tobacco abuse and probable COPD who 

presented to ED with c/o dyspnea and cough, found to have RLL pneumonia.





- Patient Problems


(1) Acute hypoxemic respiratory failure


Comment: - Secondary to COPD and pneumonia.


- Continue supplemental O2.   





(2) Right lower lobe pneumonia


Comment: - Blood cultures show no growth and Influenza was negative.


- Check Legionella and pneumococcal Ag.


- Continue Levofloxacin.   





(3) COPD exacerbation


Comment: - Secondary to pneumonia.


- Continue bronchodilators and steroids.   





(4) Anxiety


Comment: - Continue Xanax PRN.   





(5) Tobacco abuse counseling


Comment: - Patient advised about importance of quitting. She understand if she 

continues to smoke she's at risk for worsening lung disease, heart disease, 

cancer, and death. She verbalizes understanding, states she quit "cold turkey" 

before, but not interested at this time.


- Declines nicotine supplementation.   





(6) DVT prophylaxis


Comment: - SQ heparin.   





(7) Full code status





Status and Disposition: 


Inpatient for management of pneumonia and COPD, requiring >48h for 

stabilization.

## 2017-09-17 VITALS — DIASTOLIC BLOOD PRESSURE: 56 MMHG | SYSTOLIC BLOOD PRESSURE: 119 MMHG

## 2017-09-17 RX ADMIN — METHYLPREDNISOLONE SODIUM SUCCINATE SCH MG: 40 INJECTION, POWDER, FOR SOLUTION INTRAMUSCULAR; INTRAVENOUS at 05:43

## 2017-09-17 RX ADMIN — LEVOFLOXACIN SCH MLS/HR: 5 INJECTION, SOLUTION INTRAVENOUS at 05:43

## 2017-09-17 RX ADMIN — TIOTROPIUM BROMIDE SCH CAP: 18 CAPSULE ORAL; RESPIRATORY (INHALATION) at 07:47

## 2017-09-17 RX ADMIN — HEPARIN SODIUM SCH UNITS: 5000 INJECTION INTRAVENOUS; SUBCUTANEOUS at 05:46

## 2017-09-17 RX ADMIN — DOCUSATE SODIUM SCH MG: 100 CAPSULE, LIQUID FILLED ORAL at 07:47

## 2017-09-17 RX ADMIN — MOMETASONE FUROATE AND FORMOTEROL FUMARATE DIHYDRATE SCH PUFF: 200; 5 AEROSOL RESPIRATORY (INHALATION) at 07:48

## 2017-09-17 RX ADMIN — GUAIFENESIN SCH MG: 600 TABLET, EXTENDED RELEASE ORAL at 07:47

## 2017-09-17 RX ADMIN — OMEPRAZOLE SCH MG: 20 CAPSULE, DELAYED RELEASE ORAL at 05:46

## 2017-09-18 NOTE — DS
CC:  Dr. Cobb *

 

DISCHARGE SUMMARY:

 

DATE OF ADMISSION:  09/15/17

 

DATE OF DISCHARGE:  09/17/17

 

PRIMARY CARE PROVIDER:  Dr. Cobb.

 

DISCHARGE DIAGNOSES:

1.  Acute hypoxic respiratory failure.

2.  Right lower lobe pneumonia.

3.  Acute chronic obstructive pulmonary disease exacerbation secondary to 
pneumonia.

 

SECONDARY DIAGNOSES:

1.  Tobacco abuse.

2.  Anxiety.

 

MEDICATION LIST:  Ambien 10 mg p.o. at bedtime.

 

New medications:

1.  Albuterol HFA 2 puffs inhaled q.4 hours p.r.n. shortness of breath.

2.  Alprazolam 0.25 mg p.o. q.8 hours p.r.n. anxiety, MDD 3 tablets, dispensed 
15 tablets.  The [facility] Prescription Program was consulted and the only 
prescription for control substances is for zolpidem.  Reference number is 
[id number].

3.  Guaifenesin ER 1200 mg p.o. b.i.d.

4.  Levofloxacin 750 mg p.o. daily for 5 more days.

5.  Omeprazole 20 mg p.o. daily.

6.  Prednisone taper as follows:  40 mg p.o. daily for 3 days, 30 mg for 3 days
, 20 mg for 3 days, 10 mg for 3 days, then stop.

7.  Spiriva 1 capsule inhaled daily.

 

HOSPITAL COURSE:  Ms. Javier is an 85-year-old lady with the past medical 
history as stated above that presented to the emergency room with complaints of 
shortness of breath and cough.  She was diagnosed with pneumonia and discharged 
on levofloxacin that she took for less than 24 hours before returning to 
emergency room complaining she felt worse.  For more details about her 
presentation, I refer you to her history and physical.

 

Initial chest x-ray showed a right lower lobe infiltrate and the patient had 
minimal elevation of her WBC of 11.5.

 

She was admitted, she was in medical floor for further management.  With 
levofloxacin, bronchodilators, and steroids, the patient had significant 
improvement of her symptoms.

 

Initially, she was hypoxic with an oxygen saturation of 85% on room air and 
required supplemental oxygen, but as she continued to improve, she did not 
require supplemental O2 any longer and was able to maintain saturation greater 
than 90% on room air.

 

The patient was never formally diagnosed with COPD and after this episode 
resolved, she will probably benefit of a spirometry as outpatient.  The patient 
was advised about the risks of tobacco use including, but not limited to, heart 
and lung disease, stroke, multiple types of cancer.  She states that she was 
able to quit "cold turkey" in the past and she plans to do so in the future, 
but she states that she is not ready to try at this time.  She declined my 
offer for nicotine supplementation.

 

The patient is very anxious at this time going through some issues on her 
personal life.  She received prescription for a small quantity of Xanax at this 
point, but she will need further addressing of her anxiety as outpatient.

 

She is medically stable for discharge today to follow up with Dr. Cobb as 
outpatient.

 

PHYSICAL EXAMINATION:  Vital Signs:  Temperature 97.9, heart rate is 74, 
respiratory rate is 16, oxygen saturation is 96% on room air, and blood 
pressure is 124/57.  General:  The patient is a pleasant elderly lady, sitting 
up in bed, in no acute distress.  CVS:  Normal S1, S2.  Regular rate and 
rhythm.  Chest:  Breath sounds present bilaterally decreased with no added 
sounds.  Abdomen:  Soft.  Bowel sounds are present.  Extremities:  No edema.  
Neuro:  She is alert, awake, oriented x3.  Able to move all 4 extremities.

 

DIET:  Regular diet.

 

ACTIVITIES:  As tolerated.

 

DISPOSITION:  To home.

 

STATUS WHILE IN THE HOSPITAL:  Inpatient.

 

Please keep in mind that this is a summarized version of this patient's 
hospital stay.  If you need more information, please feel free to call me at [phone]
[phone] or please obtain the full medical records.

 

TIME SPENT:  Approximately 45 minutes was spent to complete this discharge.

 

 [id number]/CPS #: [id number]

MYHCAL

## 2017-09-19 ENCOUNTER — HOSPITAL ENCOUNTER (OUTPATIENT)
Dept: HOSPITAL 25 - ED | Age: 82
Setting detail: OBSERVATION
LOS: 1 days | Discharge: HOME | End: 2017-09-20
Attending: HOSPITALIST | Admitting: INTERNAL MEDICINE
Payer: MEDICARE

## 2017-09-19 DIAGNOSIS — R07.9: ICD-10-CM

## 2017-09-19 DIAGNOSIS — J18.9: ICD-10-CM

## 2017-09-19 DIAGNOSIS — R06.02: ICD-10-CM

## 2017-09-19 DIAGNOSIS — F17.210: ICD-10-CM

## 2017-09-19 DIAGNOSIS — J44.9: ICD-10-CM

## 2017-09-19 DIAGNOSIS — Z79.899: ICD-10-CM

## 2017-09-19 DIAGNOSIS — M25.511: Primary | ICD-10-CM

## 2017-09-19 PROCEDURE — 94640 AIRWAY INHALATION TREATMENT: CPT

## 2017-09-19 PROCEDURE — 99284 EMERGENCY DEPT VISIT MOD MDM: CPT

## 2017-09-19 PROCEDURE — 93005 ELECTROCARDIOGRAM TRACING: CPT

## 2017-09-19 PROCEDURE — 85610 PROTHROMBIN TIME: CPT

## 2017-09-19 PROCEDURE — 36415 COLL VENOUS BLD VENIPUNCTURE: CPT

## 2017-09-19 PROCEDURE — A9502 TC99M TETROFOSMIN: HCPCS

## 2017-09-19 PROCEDURE — 81003 URINALYSIS AUTO W/O SCOPE: CPT

## 2017-09-19 PROCEDURE — 86140 C-REACTIVE PROTEIN: CPT

## 2017-09-19 PROCEDURE — 84484 ASSAY OF TROPONIN QUANT: CPT

## 2017-09-19 PROCEDURE — 85025 COMPLETE CBC W/AUTO DIFF WBC: CPT

## 2017-09-19 PROCEDURE — 96372 THER/PROPH/DIAG INJ SC/IM: CPT

## 2017-09-19 PROCEDURE — 83880 ASSAY OF NATRIURETIC PEPTIDE: CPT

## 2017-09-19 PROCEDURE — 78452 HT MUSCLE IMAGE SPECT MULT: CPT

## 2017-09-19 PROCEDURE — 96360 HYDRATION IV INFUSION INIT: CPT

## 2017-09-19 PROCEDURE — 84443 ASSAY THYROID STIM HORMONE: CPT

## 2017-09-19 PROCEDURE — 85730 THROMBOPLASTIN TIME PARTIAL: CPT

## 2017-09-19 PROCEDURE — 83735 ASSAY OF MAGNESIUM: CPT

## 2017-09-19 PROCEDURE — 96361 HYDRATE IV INFUSION ADD-ON: CPT

## 2017-09-19 PROCEDURE — 83690 ASSAY OF LIPASE: CPT

## 2017-09-19 PROCEDURE — 80053 COMPREHEN METABOLIC PANEL: CPT

## 2017-09-19 PROCEDURE — G0378 HOSPITAL OBSERVATION PER HR: HCPCS

## 2017-09-19 PROCEDURE — 93017 CV STRESS TEST TRACING ONLY: CPT

## 2017-09-19 PROCEDURE — 83605 ASSAY OF LACTIC ACID: CPT

## 2017-09-19 PROCEDURE — 71275 CT ANGIOGRAPHY CHEST: CPT

## 2017-09-20 VITALS — SYSTOLIC BLOOD PRESSURE: 133 MMHG | DIASTOLIC BLOOD PRESSURE: 62 MMHG

## 2017-09-20 LAB
ADD DIFF/SLIDE REVIEW?: (no result)
ALBUMIN SERPL BCG-MCNC: 3.4 G/DL (ref 3.2–5.2)
ALP SERPL-CCNC: 62 U/L (ref 34–104)
ALT SERPL W P-5'-P-CCNC: 18 U/L (ref 7–52)
ANION GAP SERPL CALC-SCNC: 4 MMOL/L (ref 2–11)
AST SERPL-CCNC: 12 U/L (ref 13–39)
BUN SERPL-MCNC: 25 MG/DL (ref 6–24)
BUN/CREAT SERPL: 27.8 (ref 8–20)
CALCIUM SERPL-MCNC: 9 MG/DL (ref 8.6–10.3)
CHLORIDE SERPL-SCNC: 101 MMOL/L (ref 101–111)
GLOBULIN SER CALC-MCNC: 3.2 G/DL (ref 2–4)
GLUCOSE SERPL-MCNC: 143 MG/DL (ref 70–100)
HCO3 SERPL-SCNC: 26 MMOL/L (ref 22–32)
HCT VFR BLD AUTO: 37 % (ref 35–47)
HGB BLD-MCNC: 12.5 G/DL (ref 12–16)
LIPASE SERPL-CCNC: 40 U/L (ref 11–82)
MAGNESIUM SERPL-MCNC: 2 MG/DL (ref 1.9–2.7)
MCH RBC QN AUTO: 29 PG (ref 27–31)
MCHC RBC AUTO-ENTMCNC: 34 G/DL (ref 31–36)
MCV RBC AUTO: 86 FL (ref 80–97)
POTASSIUM SERPL-SCNC: 3.3 MMOL/L (ref 3.5–5)
PROT SERPL-MCNC: 6.6 G/DL (ref 6.4–8.9)
RBC # BLD AUTO: 4.32 10^6/UL (ref 4–5.4)
SODIUM SERPL-SCNC: 131 MMOL/L (ref 133–145)
TROPONIN I SERPL-MCNC: 0 NG/ML (ref ?–0.04)
TSH SERPL-ACNC: 0.41 MCIU/ML (ref 0.34–5.6)
WBC # BLD AUTO: 12.7 10^3/UL (ref 3.5–10.8)

## 2017-09-20 RX ADMIN — HEPARIN SODIUM SCH UNITS: 5000 INJECTION INTRAVENOUS; SUBCUTANEOUS at 05:46

## 2017-09-20 RX ADMIN — HEPARIN SODIUM SCH UNITS: 5000 INJECTION INTRAVENOUS; SUBCUTANEOUS at 14:12

## 2017-09-20 NOTE — RAD
Edited for charges.



Indication: Chest pain.



Myocardial perfusion scan was performed utilizing 1 day protocol.



Rest myocardial perfusion was performed after intravenous injection of 10.6 mCi 
of

technetium 99m tetrofosmin. Treadmill stress study was performed and the 
maximum heart

rate achieved was 69% of the maximum predicted value. 25.7 mCi of technetium 99m

tetrofosmin was injected for the stress portion of the study.



There is homogeneous distribution of the radiotracer throughout the left 
centered. There

is no evidence of a fixed or reversible perfusion defect identified.



The ejection fraction at stress is 82%. Evaluation of wall motion demonstrates 
no focal

wall motion abnormality.



IMPRESSION: No evidence of fixed or reversible perfusion defect is identified. 
Normal

ejection fraction.



ASSESSMENT:  Low risk



Based on imaging criteria from ACC/AHA 2002 Guideline Update for the Management 
of

Patients With Chronic Stable Angina Table 23. Noninvasive Risk Stratification.



***Reference.***



MTDD

## 2017-09-20 NOTE — ED
Chalino ZAMAN Nikita, scribed for Trey Myrick MD on 09/20/17 at 0020 .





HPI Chest Pain





- HPI Summary


HPI Summary: 





This patient is an 85 year old F presenting to ED with a chief complaint of CP 

since 2100 last night. The CC is described as tight and non-radiating. The 

patient rates the pain 1-2/10 in severity. Symptoms aggravated by nothing. 

Symptoms alleviated by nothing. Patient reports decreased sleep, productive 

cough (from COPD), decreased appetite, SOB, and swelling in ankles. Patient 

denies nausea and diaphoresis. Pt was released from hospital 2 days ago for PNA.





- History of Current Complaint


Chief Complaint: EDChestPainROMI


Time Seen by Provider: 09/20/17 00:09


Hx Obtained From: Patient


Onset/Duration: Started Days Ago - last night at 2100, Still Present


Timing: Constant, Lasting Days


Initial Severity: Mild


Current Severity: Mild


Pain Intensity: 2


Pain Scale Used: 0-10 Numeric


Chest Pain Location: Discrete at: - across chest


Chest Pain Radiates: No


Character: Tightness


Aggravating Factor(s): Nothing


Alleviating Factor(s): Nothing


Associated Signs and Symptoms: Positive: Other: - Patient reports decreased 

sleep, productive cough (from COPD), decreased appetite, SOB, and swelling in 

ankles. Patient denies nausea and diaphoresis.





- Additional Pertinent History


Primary Care Physician: GARY





- Allergy/Home Medications


Allergies/Adverse Reactions: 


 Allergies











Allergy/AdvReac Type Severity Reaction Status Date / Time


 


No Known Allergies Allergy   Verified 09/20/17 01:47














PMH/Surg Hx/FS Hx/Imm Hx


Endocrine/Hematology History: 


   Denies: Hx Diabetes


Cardiovascular History: 


   Denies: Hx Congestive Heart Failure, Hx Hypertension


Respiratory History: Reports: Hx Chronic Obstructive Pulmonary Disease (COPD), 

Other Respiratory Problems/Disorders - current SOB with exertion


 History: 


   Denies: Hx Renal Disease


Sensory History: Reports: Hx Contacts or Glasses


   Denies: Hx Hearing Aid


Opthamlomology History: Reports: Hx Contacts or Glasses





- Surgical History


Surgery Procedure, Year, and Place: gallbladder removal, appy, hysterectomy





- Immunization History


Date of Tetanus Vaccine: Unknown


Infectious Disease History: Unable to Obtain/Confirm


Infectious Disease History: 


   Denies: Traveled Outside the [place] in Last 30 Days





- Family History


Known Family History: Positive: Other - POS: DM, CA


   Negative: Hypertension





- Social History


Alcohol Use: None


Substance Use Type: Reports: None


Smoking Status (MU): Heavy Every Day Tobacco Smoker





Review of Systems


Negative: Skin Diaphoresis


Positive: Chest Pain - across chest, tight and non-radiating


Positive: Shortness Of Breath, Cough - productive


Positive: Other - decreased appetite.  Negative: Nausea


Positive: Other - swelling in ankles


All Other Systems Reviewed And Are Negative: Yes





Physical Exam


Triage Information Reviewed: Yes


Vital Signs On Initial Exam: 


 Initial Vitals











Temp Pulse Resp BP Pulse Ox


 


 98 F   92   20   159/100   96 


 


 09/19/17 22:56  09/19/17 22:56  09/19/17 22:56  09/19/17 22:56  09/19/17 22:56











Vital Signs Reviewed: Yes


Appearance: Positive: Well-Appearing, No Pain Distress


Skin: Positive: Warm, Skin Color Reflects Adequate Perfusion, Dry


Head/Face: Positive: Normal Head/Face Inspection


Eyes: Positive: EOMI, BA


ENT: Positive: Normal ENT inspection


Neck: Positive: Supple, Nontender


Respiratory/Lung Sounds: Positive: Clear to Auscultation, Breath Sounds Present


Cardiovascular: Positive: RRR


Abdomen Description: Positive: Nontender, Soft


Bowel Sounds: Positive: Present


Musculoskeletal: Positive: Strength/ROM Intact, Other - Bilateral pedal edema


Neurological: Positive: Normal, Sensory/Motor Intact, Alert, Oriented to Person 

Place, Time


Psychiatric: Positive: Affect/Mood Appropriate





- [place] Coma Scale


Coma Scale Total: 15





Diagnostics





- Vital Signs


 Vital Signs











  Temp Pulse Resp BP Pulse Ox


 


 09/19/17 22:56  98 F  92  20  159/100  96














- Laboratory


Lab Results: 


 Lab Results











  09/20/17 09/20/17 09/20/17 Range/Units





  00:40 00:40 00:40 


 


WBC     (3.5-10.8)  10^3/ul


 


RBC     (4.0-5.4)  10^6/ul


 


Hgb     (12.0-16.0)  g/dl


 


Hct     (35-47)  %


 


MCV     (80-97)  fL


 


MCH     (27-31)  pg


 


MCHC     (31-36)  g/dl


 


RDW     (10.5-15)  %


 


Plt Count     (150-450)  10^3/ul


 


MPV     (7.4-10.4)  um3


 


Neut % (Auto)     (38-83)  %


 


Lymph % (Auto)     (25-47)  %


 


Mono % (Auto)     (1-9)  %


 


Eos % (Auto)     (0-6)  %


 


Baso % (Auto)     (0-2)  %


 


Absolute Neuts (auto)     (1.5-7.7)  10^3/ul


 


Absolute Lymphs (auto)     (1.0-4.8)  10^3/ul


 


Absolute Monos (auto)     (0-0.8)  10^3/ul


 


Absolute Eos (auto)     (0-0.6)  10^3/ul


 


Absolute Basos (auto)     (0-0.2)  10^3/ul


 


Absolute Nucleated RBC     10^3/ul


 


Nucleated RBC %     


 


INR (Anticoag Therapy)  1.05    (0.89-1.11)  


 


APTT  26.1    (26.0-36.3)  seconds


 


Sodium    131 L  (133-145)  mmol/L


 


Potassium    3.3 L  (3.5-5.0)  mmol/L


 


Chloride    101  (101-111)  mmol/L


 


Carbon Dioxide    26  (22-32)  mmol/L


 


Anion Gap    4  (2-11)  mmol/L


 


BUN    25 H  (6-24)  mg/dL


 


Creatinine    0.90  (0.51-0.95)  mg/dL


 


Est GFR ( Amer)    76.5  (>60)  


 


Est GFR (Non-Af Amer)    59.5  (>60)  


 


BUN/Creatinine Ratio    27.8 H  (8-20)  


 


Glucose    143 H  ()  mg/dL


 


Lactic Acid     (0.5-2.0)  mmol/L


 


Calcium    9.0  (8.6-10.3)  mg/dL


 


Magnesium    2.0  (1.9-2.7)  mg/dL


 


Total Bilirubin    0.30  (0.2-1.0)  mg/dL


 


AST    12 L  (13-39)  U/L


 


ALT    18  (7-52)  U/L


 


Alkaline Phosphatase    62  ()  U/L


 


Troponin I    0.00  (<0.04)  ng/mL


 


C-Reactive Protein    8.55 H  (< 5.00)  mg/L


 


B-Natriuretic Peptide   49  ( - 100) pg/mL


 


Total Protein    6.6  (6.4-8.9)  g/dL


 


Albumin    3.4  (3.2-5.2)  g/dL


 


Globulin    3.2  (2-4)  g/dL


 


Albumin/Globulin Ratio    1.1  (1-3)  


 


Lipase    40  (11.0-82.0)  U/L


 


TSH    0.41  (0.34-5.60)  mcIU/mL


 


Urine Color     


 


Urine Appearance     


 


Urine pH     (5-9)  


 


Ur Specific Gravity     (1.010-1.030)  


 


Urine Protein     (Negative)  


 


Urine Ketones     (Negative)  


 


Urine Blood     (Negative)  


 


Urine Nitrate     (Negative)  


 


Urine Bilirubin     (Negative)  


 


Urine Urobilinogen     (Negative)  


 


Ur Leukocyte Esterase     (Negative)  


 


Urine WBC (Auto)     (Absent)  


 


Urine RBC (Auto)     (Absent)  


 


Ur Squamous Epith Cells     (Absent)  


 


Urine Bacteria     (Absent)  


 


Urine Glucose     (Negative)  


 


Urine Ascorbic Acid     (Negative)  














  09/20/17 09/20/17 09/20/17 Range/Units





  00:40 00:40 02:41 


 


WBC  12.7 H    (3.5-10.8)  10^3/ul


 


RBC  4.32    (4.0-5.4)  10^6/ul


 


Hgb  12.5    (12.0-16.0)  g/dl


 


Hct  37    (35-47)  %


 


MCV  86    (80-97)  fL


 


MCH  29    (27-31)  pg


 


MCHC  34    (31-36)  g/dl


 


RDW  13    (10.5-15)  %


 


Plt Count  343    (150-450)  10^3/ul


 


MPV  7 L    (7.4-10.4)  um3


 


Neut % (Auto)  83.8 H    (38-83)  %


 


Lymph % (Auto)  11.5 L    (25-47)  %


 


Mono % (Auto)  4.5    (1-9)  %


 


Eos % (Auto)  0    (0-6)  %


 


Baso % (Auto)  0.2    (0-2)  %


 


Absolute Neuts (auto)  10.6 H    (1.5-7.7)  10^3/ul


 


Absolute Lymphs (auto)  1.5    (1.0-4.8)  10^3/ul


 


Absolute Monos (auto)  0.6    (0-0.8)  10^3/ul


 


Absolute Eos (auto)  0    (0-0.6)  10^3/ul


 


Absolute Basos (auto)  0    (0-0.2)  10^3/ul


 


Absolute Nucleated RBC  0    10^3/ul


 


Nucleated RBC %  0    


 


INR (Anticoag Therapy)     (0.89-1.11)  


 


APTT     (26.0-36.3)  seconds


 


Sodium     (133-145)  mmol/L


 


Potassium     (3.5-5.0)  mmol/L


 


Chloride     (101-111)  mmol/L


 


Carbon Dioxide     (22-32)  mmol/L


 


Anion Gap     (2-11)  mmol/L


 


BUN     (6-24)  mg/dL


 


Creatinine     (0.51-0.95)  mg/dL


 


Est GFR ( Amer)     (>60)  


 


Est GFR (Non-Af Amer)     (>60)  


 


BUN/Creatinine Ratio     (8-20)  


 


Glucose     ()  mg/dL


 


Lactic Acid   0.7   (0.5-2.0)  mmol/L


 


Calcium     (8.6-10.3)  mg/dL


 


Magnesium     (1.9-2.7)  mg/dL


 


Total Bilirubin     (0.2-1.0)  mg/dL


 


AST     (13-39)  U/L


 


ALT     (7-52)  U/L


 


Alkaline Phosphatase     ()  U/L


 


Troponin I     (<0.04)  ng/mL


 


C-Reactive Protein     (< 5.00)  mg/L


 


B-Natriuretic Peptide    ( - 100) pg/mL


 


Total Protein     (6.4-8.9)  g/dL


 


Albumin     (3.2-5.2)  g/dL


 


Globulin     (2-4)  g/dL


 


Albumin/Globulin Ratio     (1-3)  


 


Lipase     (11.0-82.0)  U/L


 


TSH     (0.34-5.60)  mcIU/mL


 


Urine Color    Straw  


 


Urine Appearance    Clear  


 


Urine pH    6  (5-9)  


 


Ur Specific Gravity    1.000 L  (1.010-1.030)  


 


Urine Protein    N  (Negative)  


 


Urine Ketones    Negative  (Negative)  


 


Urine Blood    N  (Negative)  


 


Urine Nitrate    N  (Negative)  


 


Urine Bilirubin    Negative  (Negative)  


 


Urine Urobilinogen    N  (Negative)  


 


Ur Leukocyte Esterase    Negative  (Negative)  


 


Urine WBC (Auto)    Absent  (Absent)  


 


Urine RBC (Auto)    Trace(0-2/hpf)  (Absent)  


 


Ur Squamous Epith Cells    Present H  (Absent)  


 


Urine Bacteria    Absent  (Absent)  


 


Urine Glucose    N  (Negative)  


 


Urine Ascorbic Acid    N  (Negative)  











Result Diagrams: 


 09/20/17 00:40





 09/20/17 00:40


Lab Statement: Any lab studies that have been ordered have been reviewed, and 

results considered in the medical decision making process.





- CT


  ** CTA chest


CT Interpretation Completed By: Radiologist - Negative for pulmonary ebolus. 

Negative for thoracic aortic aneurysm or dissection. Mininal interstitial 

changes right lower lobe posterior laterally. Otherwise lungs are clear of 

acute disease. ED physician has reviewed this radiology report and agrees.





- EKG


  ** 0227


Cardiac Rate: NL - 72 bpm


EKG Rhythm: Sinus Rhythm


ST Segment: Normal


Ectopy: None





Re-Evaluation





- Re-Evaluation


  ** First Eval


Re-Evaluation Time: 02:58


Comment: Pt agrees to admission.





Chest Pain Course/Dx





- Course


Assessment/Plan: This patient is an 85 year old F presenting to ED with a chief 

complaint of CP since 2100 last night. The CC is described as tight and non-

radiating. The patient rates the pain 1-2/10 in severity. Symptoms aggravated 

by nothing. Symptoms alleviated by nothing. Patient reports decreased sleep, 

productive cough (from COPD), decreased appetite, SOB, and swelling in ankles. 

Patient denies nausea and diaphoresis. CTA chest reveals negative for pulmonary 

ebolus. Negative for thoracic aortic aneurysm or dissection. Mininal 

interstitial changes right lower lobe posterior laterally. Otherwise lungs are 

clear of acute disease. ED physician has reviewed this radiology report and 

agrees. EKG reveals NSR, Normal ST, and no ectopy. In the ED course, pt was 

given fluids and ASA. Medications reviewed. Pt will be admitted to [facility]. Pt is 

agreeable with this plan.  ADMIT HOSPITALIST.  NO CRITICAL CARE TIME.





- Diagnoses


Provider Diagnoses: 


 Chest pain








Discharge





- Discharge Plan


Condition: Stable


Disposition: ADMITTED TO [facility] documentation as recorded by the Chalino hollis Nikita accurately reflects the 

service I personally performed and the decisions made by me, Trey Myrick MD.

## 2017-09-21 NOTE — DS
DISCHARGE SUMMARY:

 

DATE OF ADMISSION:  09/20/17

 

DATE OF DISCHARGE:  09/20/17

 

ADMITTING PHYSICIAN:  Micheline Green MD.

 

PRIMARY CARE PROVIDER:  Mariela Cobb MD.

 

CHIEF COMPLAINT:  Right shoulder pain.

 

PRINCIPAL DIAGNOSES:  Musculoskeletal pain; acute coronary syndrome ruled out, 
recent diagnosis of pneumonia versus chronic obstructive pulmonary disease 
exacerbation.

 

HISTORY OF PRESENT ILLNESS AND HOSPITAL COURSE:  Ms. Javier is an 85-year-old 
female with past medical history of COPD with chronic smoking, recent diagnosis 
of pneumonia.  The patient has been also undergoing a lot of stress at home due 
to family issues, which she did not elaborate "was very depressed."  The 
patient was just discharged on 09/17/17 on the course of prednisone and 
Levaquin.  She attests that she had right shoulder pain possibly from sleeping 
on it on her right side. The admitting H and P described description of upper 
chest discomfort, which lasted 15 to 20 minutes and then resolved spontaneously
, no radiation, not pleuritic, no change with movement. Troponins were trended 
and were negative x4.  EKG showed no ischemic changes.  The patient was 
observed on telemetry overnight and underwent nuclear stress test in the morning
, which was seen low risk and the EF was above 80%.  In the ED, the patient had 
a CT chest angiogram, which demonstrated no evidence of pulmonary embolism, but 
evidence of emphysema and mild airspace disease of the right lung base with 
patchy ground-glass opacities.  The patient was discharged on continuation of  
her Levaquin and prednisone taper and strongly urged to quit smoking and to 
follow with her [place] pulmonologist, Dr. Mota.  The patient was also 
educated that her Spiriva inhaler was meant to be taken daily and not as needed 
when feeling more poorly.

 

MEDICATIONS ON DISCHARGE:  Unchanged, include:

1.  Prednisone taper currently 30 mg daily.

2.  Guaifenesin 1200 mg b.i.d.

3.  Ambien 10 mg q.h.s.

4.  Spiriva 1 inhalation daily.

5.  Omeprazole 20 mg daily.

6.  Levaquin 750 mg daily.

7.  Albuterol inhaler 2 puffs p.r.n.

8.  Xanax 0.25 mg every 8 hours p.r.n.

 

DISPOSITION:  Home.

 

DIET:  Regular, unchanged.

 

FOLLOWUP:  The patient will need to follow up with Dr. Cobb, Dr. Mota.

 

 [id number]/[facility] #: [id number]

MTDGREGORIA

## 2017-10-18 ENCOUNTER — HOSPITAL ENCOUNTER (INPATIENT)
Dept: HOSPITAL 25 - ED | Age: 82
LOS: 2 days | Discharge: HOME | DRG: 190 | End: 2017-10-20
Attending: HOSPITALIST | Admitting: HOSPITALIST
Payer: MEDICARE

## 2017-10-18 DIAGNOSIS — J18.8: ICD-10-CM

## 2017-10-18 DIAGNOSIS — F17.210: ICD-10-CM

## 2017-10-18 DIAGNOSIS — R00.0: ICD-10-CM

## 2017-10-18 DIAGNOSIS — Z81.1: ICD-10-CM

## 2017-10-18 DIAGNOSIS — Z88.0: ICD-10-CM

## 2017-10-18 DIAGNOSIS — Z79.899: ICD-10-CM

## 2017-10-18 DIAGNOSIS — Z84.89: ICD-10-CM

## 2017-10-18 DIAGNOSIS — J44.1: Primary | ICD-10-CM

## 2017-10-18 DIAGNOSIS — F41.9: ICD-10-CM

## 2017-10-18 DIAGNOSIS — H35.30: ICD-10-CM

## 2017-10-18 LAB
ALBUMIN SERPL BCG-MCNC: 3 G/DL (ref 3.2–5.2)
ALP SERPL-CCNC: 60 U/L (ref 34–104)
ALT SERPL W P-5'-P-CCNC: 10 U/L (ref 7–52)
ANION GAP SERPL CALC-SCNC: 5 MMOL/L (ref 2–11)
AST SERPL-CCNC: 10 U/L (ref 13–39)
BUN SERPL-MCNC: 9 MG/DL (ref 6–24)
BUN/CREAT SERPL: 15.3 (ref 8–20)
CALCIUM SERPL-MCNC: 8.1 MG/DL (ref 8.6–10.3)
CHLORIDE SERPL-SCNC: 102 MMOL/L (ref 101–111)
GLOBULIN SER CALC-MCNC: 3.2 G/DL (ref 2–4)
GLUCOSE SERPL-MCNC: 116 MG/DL (ref 70–100)
HCO3 SERPL-SCNC: 25 MMOL/L (ref 22–32)
HCT VFR BLD AUTO: 33 % (ref 35–47)
HGB BLD-MCNC: 10.8 G/DL (ref 12–16)
MCH RBC QN AUTO: 28 PG (ref 27–31)
MCHC RBC AUTO-ENTMCNC: 33 G/DL (ref 31–36)
MCV RBC AUTO: 85 FL (ref 80–97)
POTASSIUM SERPL-SCNC: 3.7 MMOL/L (ref 3.5–5)
PROT SERPL-MCNC: 6.2 G/DL (ref 6.4–8.9)
RBC # BLD AUTO: 3.87 10^6/UL (ref 4–5.4)
SODIUM SERPL-SCNC: 132 MMOL/L (ref 133–145)
TROPONIN I SERPL-MCNC: 0.01 NG/ML (ref ?–0.04)
WBC # BLD AUTO: 10.5 10^3/UL (ref 3.5–10.8)

## 2017-10-18 PROCEDURE — 94760 N-INVAS EAR/PLS OXIMETRY 1: CPT

## 2017-10-18 PROCEDURE — 87502 INFLUENZA DNA AMP PROBE: CPT

## 2017-10-18 PROCEDURE — 83605 ASSAY OF LACTIC ACID: CPT

## 2017-10-18 PROCEDURE — 71010: CPT

## 2017-10-18 PROCEDURE — 85025 COMPLETE CBC W/AUTO DIFF WBC: CPT

## 2017-10-18 PROCEDURE — 36415 COLL VENOUS BLD VENIPUNCTURE: CPT

## 2017-10-18 PROCEDURE — 80053 COMPREHEN METABOLIC PANEL: CPT

## 2017-10-18 PROCEDURE — 94640 AIRWAY INHALATION TREATMENT: CPT

## 2017-10-18 PROCEDURE — 84484 ASSAY OF TROPONIN QUANT: CPT

## 2017-10-18 PROCEDURE — 87040 BLOOD CULTURE FOR BACTERIA: CPT

## 2017-10-18 PROCEDURE — 81003 URINALYSIS AUTO W/O SCOPE: CPT

## 2017-10-18 PROCEDURE — 87899 AGENT NOS ASSAY W/OPTIC: CPT

## 2017-10-18 PROCEDURE — 93005 ELECTROCARDIOGRAM TRACING: CPT

## 2017-10-18 PROCEDURE — 86140 C-REACTIVE PROTEIN: CPT

## 2017-10-18 PROCEDURE — 85610 PROTHROMBIN TIME: CPT

## 2017-10-18 PROCEDURE — 80048 BASIC METABOLIC PNL TOTAL CA: CPT

## 2017-10-18 RX ADMIN — PREDNISONE SCH MG: 20 TABLET ORAL at 20:50

## 2017-10-18 RX ADMIN — MIRTAZAPINE SCH MG: 15 TABLET, FILM COATED ORAL at 20:51

## 2017-10-18 RX ADMIN — MOMETASONE FUROATE AND FORMOTEROL FUMARATE DIHYDRATE SCH: 200; 5 AEROSOL RESPIRATORY (INHALATION) at 20:10

## 2017-10-18 RX ADMIN — SODIUM CHLORIDE ONE MLS/HR: 900 IRRIGANT IRRIGATION at 15:35

## 2017-10-18 RX ADMIN — HEPARIN SODIUM SCH UNITS: 5000 INJECTION INTRAVENOUS; SUBCUTANEOUS at 20:50

## 2017-10-18 NOTE — HP
CC:  Dr. Cobb *

 

HISTORY AND PHYSICAL:

 

DATE OF ADMISSION:  10/18/17

 

PRIMARY CARE PROVIDER:  Dr. Cobb.

 

ATTENDING PHYSICIAN WHILE IN THE HOSPITAL:  Audra Henderson MD * (report 
dictated by Yusuf Ruiz NP).

 

CHIEF COMPLAINT:

1.  Cough.

2.  Shortness of breath.

3.  Chills.

 

HISTORY OF PRESENT ILLNESS:  Ms. Javier is an 85-year-old female patient.  She 
has a history of pneumonia recurrently.  She states she had pneumonia about a 
month ago.  She has a history of COPD and macular degeneration.  She comes in 
today stating that over the weekend she felt well.  She had 1 episode of 
diarrhea on Monday after going out to eat.  She just said she was feeling 
unwell yesterday evening and then today she was going out to do errand, she 
came back and  she could not get warm.  She was chilled.  No matter what she did
, she just was feeling chilled, could not get warm.  She states that she has no 
cough, no rhinorrhea, no sore throat.  She said she had no cough in the last 
few days, but she thinks she is starting one now.  She does state she feels 
short of breath, but it is no more than her baseline.  She denies any chest 
pain.  Denies having any back pain.  No pain with taking deep breath.  She was 
concerned because she just was not feeling well.  She talked to her son.  With 
her history of pneumonia, she decided to come into our ER and be evaluated.  
She came in, was evaluated, it was noted on imaging that she appeared to have 
pneumonia again, so we were asked to evaluate for admission.

 

PAST MEDICAL HISTORY:  Significant for:

1.  Pneumonia.

2.  COPD.

3.  Macular degeneration.

 

PAST SURGICAL HISTORY:

1.  The patient has had a cholecystectomy.

2.  Appendectomy.

3.  Hysterectomy.

4.  Cataracts.

 

MEDICATIONS:  Include:

1.  Ambien 5 mg at bedtime as needed.

2.  Spiriva 1 capsule inhaled daily.

3.  Prilosec 20 mg daily.

4.  Remeron 15 mg p.o. at bedtime.

5.  Albuterol 2 puffs inhale every 4 hours as needed.

 

ALLERGIES:  To medications include PENICILLIN.

 

FAMILY HISTORY:  Mother had a history of dementia.  Father had a history of 
ETOH abuse.

 

SOCIAL HISTORY:  She is a pack a day smoker.  She does not drink alcohol.  She 
has 3 children.  Surrogate decision maker is her son, Aniket.

 

REVIEW OF SYSTEMS: There is documented fever here. She did admit to having 
chills. No documented fevers at home.  She denied having any double vision. No 
ear discharge. No rhinorrhea. No sore throat. No thyroid enlargement.  Denied 
having any chest pain.  No orthopnea.  No nocturnal dyspnea.  There is 
shortness of breath with exertion, but she states that it feels like it is at 
her baseline.  She denied having any abdominal discomfort.  There is no nausea, 
no vomiting.  There was some diarrhea.  No dysuria.  There was no frequency or 
any seizure.  No loss of consciousness.  No pruritus and no skin ulcerations.  
Review of 14 systems was completed; all others negative.

 

                               PHYSICAL EXAMINATION

 

GENERAL:  At this time, Ms. Javier is an 85-year-old female patient.  She is 
sitting in the ER stretcher.  She does not appear to be in any acute distress.

 

VITAL SIGNS:  Blood pressure 108/58 with pulse of 101, respirations 18, O2 sat 
94%, and temperature 100.3.

 

HEENT:  Head:  Atraumatic, normocephalic.  Eyes:  EOMs are intact.  Sclerae are 
anicteric and not pale.  Throat:  Oral mucosa appears to be dry.  No 
oropharyngeal erythema.

 

NECK:  Supple.

 

LUNGS:  She had wheezing noted in the upper lobes.  She had crackles on the 
right side.  She had equal diaphragmatic expansion.

 

HEART:  Sounds S1, S2.  Regular rate and rhythm.  No murmurs, rubs, or gallops.

 

ABDOMEN:  Soft, flat, nontender.  Bowel sounds were present.

 

EXTREMITIES:  Pulses are 2+ throughout.  She is moving all 4 extremities with 5/
5 strength.

 

NEUROLOGIC:  She is awake, alert, and oriented x3.  Tongue was midline.   
were equal.  No gross focal deficits.

 

SKIN:  Intact.

 

 LABORATORY DATA/DIAGNOSTIC STUDIES:  Labs revealed a WBC of 10.5, RBC of 3.87, 
hemoglobin 10.8, hematocrit 33, and a platelet count of 315,000.  INR 1.08.  
Sodium 132, potassium 3.7, chloride of 102, bicarb 25, BUN 9, creatinine 0.59, 
and glucose 116.  Lactate 1.  Calcium 8.1.  Total bili 0.3, AST 10, ALT 10, alk 
phos 60. Troponin 0.01.  CRP 93.  Albumin of 3.0.

 

She did have a chest x-ray obtained today, impression:  Constellation of 
findings favors bronchopneumonia with new areas of consolidation compared to 09/
14/17 exam. 



She had an EKG obtained today as well, which revealed, they are calling it 
atrial fibrillation, but to me it looks like sinus tachycardia with artifact, 
rate of 117. On the monitor now, she is noted to be in a normal sinus rhythm.  
There is no ST elevation or T-wave inversions.  It was reviewed with the 
previous EKG.  She is tachycardic now.  



Old medical records were reviewed.

 

ASSESSMENT AND PLAN:  Ms. Javier is an 85-year-old female patient coming in to 
the ED today with complaints of cough, chills, and not feeling well.  She will 
be admitted under inpatient status for:

 

1.  Pneumonia with chronic obstructive pulmonary disease exacerbation.  At this 
point, I am going to put her on Levaquin, put her on nebs standing, p.r.n. 
albuterol, steroids.  Flutter valve has been ordered.  I also did order some 
Dulera for the patient.  She states she is on Symbicort at home and again we 
will continue with aggressive pulmonary toileting.  I did order the respiratory 
driven protocol and we will continue to follow.  Again, antibiotics.  Blood 
cultures have been found, we will get Legionella antigen, we will strep pneumo 
antigen and sputum cultures.

2.  Macular degeneration.  Follow with her primary.

3.  DVT prophylaxis.  Heparin subcu.

4.  Code status.  She wishes to be a DNR.  She states she has filled one out in 
the past here.  We will try to get those records.  If not, we will fill out a 
MOLST with her.

5.  Fluid, electrolytes, and nutrition.  She could be on a regular diet.

 

TIME SPENT:  On the admission 60 minutes, greater than half the time spent face-
to- face with the patient obtaining my history and physical, other half time 
spent going over the plan of care with the patient and implementing plan of 
care.

 

I did discuss the plan of care with my attending, Dr. Henderson; she is in 
agreement.

 

 ____________________________________ YUSUF RUIZ, MAIRA

 

[id number]/[facility] #: [id number]

MYCHAL

## 2017-10-18 NOTE — ED
Med ZAMAN Nilda, scribed for Martell Shields MD on 10/18/17 at 1528 .





Complex/Multi-Sys Presentation





- HPI Summary


HPI Summary: 


This patient is an 85 year old F presenting to [facility] with a chief complaint of 

malaise since 5 days ago. Patient called PCP who recommended that she come to 

ED. Symptoms aggravated and alleviated by nothing. Patient reports severe 

episode of diarrhea (5 days ago, resolved), fever, chills, and cough (currently 

not present, associated with COPD). Patient denies issues with urination, edema

, pain, and dyspnea. Three weeks ago, patient was diagnosed with pneumonia and 

is still currently on antibiotics.





- History Of Current Complaint


Chief Complaint: EDShortnessOfBreath


Time Seen by Provider: 10/18/17 15:17


Hx Obtained From: Patient


Onset/Duration: Sudden Onset, Lasting Days - 5 days, Still Present


Timing: Constant


Severity Currently: Moderate


Aggravating Factor(s): nothing


Alleviating Factor(s): nothing


Associated Signs And Symptoms: Positive: Other - severe episode of diarrhea (5 

days ago, resolved), fever, chills, and cough (COPD, currently not present). 

Patient denies issues with urination, edema, pain, and dyspnea.


Related History: Recent Illness - Three weeks ago patient diagnosed with PNA





- Allergies/Home Medications


Allergies/Adverse Reactions: 


 Allergies











Allergy/AdvReac Type Severity Reaction Status Date / Time


 


Penicillins [PCN] Allergy  Shortness Verified 10/18/17 15:08





   of Breath  











Home Medications: 


 Home Medications





Albuterol/Ipratropium NEB.SOL* [Duoneb (Albuterol 2.5 MG/Ipratropium 0.5 MG)] 1 

neb INH Q4H PRN 10/18/17 [History Confirmed 10/18/17]


Budesonide/Formote 160/4.5(NF) [Symbicort 160/4.5 (NF)] 2 puff INH BID 10/18/17 

[History Confirmed 10/18/17]


Mirtazapine TAB* [Remeron TAB*] 15 mg PO BEDTIME 10/18/17 [History Confirmed 10/

18/17]


Zolpidem TAB* [Ambien TAB*] 5 mg PO BEDTIME PRN 10/18/17 [History Confirmed 10/

18/17]











PMH/Surg Hx/FS Hx/Imm Hx


Endocrine/Hematology History: 


   Denies: Hx Diabetes


Cardiovascular History: Reports: Hx Angina


   Denies: Hx Congestive Heart Failure, Hx Coronary Artery Disease, Hx 

Hypercholesterolemia, Hx Hypertension, Hx Myocardial Infarction


Respiratory History: Reports: Hx Chronic Obstructive Pulmonary Disease (COPD), 

Hx Pneumonia, Other Respiratory Problems/Disorders - current SOB with exertion


 History: 


   Denies: Hx Renal Disease


Sensory History: Reports: Hx Cataracts - cataract removal, Hx Contacts or 

Glasses, Hx Macular Degeneration, Hx Hearing Problem


   Denies: Hx Hearing Aid


Opthamlomology History: Reports: Hx Cataracts - cataract removal, Hx Contacts 

or Glasses, Hx Macular Degeneration


Neurological History: Reports: Other Neuro Impairments/Disorders - familial 

tremors


Psychiatric History: Reports: Hx Depression





- Surgical History


Surgery Procedure, Year, and Place: gallbladder removal, appy, hysterectomy





- Immunization History


Date of Tetanus Vaccine: Unknown


Infectious Disease History: No


Infectious Disease History: 


   Denies: Traveled Outside the [place] in Last 30 Days





- Family History


Known Family History: Positive: Other - POS: DM, CA


   Negative: Hypertension, Diabetes





- Social History


Alcohol Use: None


Substance Use Type: Reports: None


Smoking Status (MU): Heavy Every Day Tobacco Smoker


Type: Cigarettes


Amount Used/How Often: half pack a day (last smoked on 9/18)


Have You Smoked in the Last Year: Yes





Review of Systems


Positive: Fever, Chills, Other - malaise; negative pain


Positive: Cough, Other - negative dyspnea


Positive: Diarrhea - 5 days ago, resolved


Positive: other - negative abnormal urinary symptoms


Negative: Edema


All Other Systems Reviewed And Are Negative: Yes





Physical Exam


Triage Information Reviewed: Yes


Vital Signs On Initial Exam: 


 Initial Vitals











Temp Pulse Resp BP Pulse Ox


 


 98.9 F   125   24   149/77   92 


 


 10/18/17 15:04  10/18/17 15:04  10/18/17 15:04  10/18/17 15:04  10/18/17 15:04











Vital Signs Reviewed: Yes


Appearance: Positive: Well-Appearing, No Pain Distress


Skin: Positive: Warm, Skin Color Reflects Adequate Perfusion, Dry


Head/Face: Positive: Normal Head/Face Inspection


Eyes: Positive: Normal


ENT: Positive: Normal ENT inspection


Neck: Positive: Supple, Nontender


Respiratory/Lung Sounds: Positive: Breath Sounds Present, Other - Crackles in 

bilateral base, suggestion of right upper lobe egophony


Cardiovascular: Positive: Tachycardia


Abdomen Description: Positive: Nontender, Soft


Bowel Sounds: Positive: Present


Musculoskeletal: Positive: Normal


Neurological: Positive: Normal


Psychiatric: Positive: Normal, Affect/Mood Appropriate





Diagnostics





- Vital Signs


 Vital Signs











  Temp Pulse Resp BP Pulse Ox


 


 10/18/17 15:04  98.9 F  125  24  149/77  92














- Laboratory


Lab Results: 


 Lab Results











  10/18/17 10/18/17 10/18/17 Range/Units





  16:40 16:40 16:40 


 


WBC   10.5   (3.5-10.8)  10^3/ul


 


RBC   3.87 L   (4.0-5.4)  10^6/ul


 


Hgb   10.8 L   (12.0-16.0)  g/dl


 


Hct   33 L   (35-47)  %


 


MCV   85   (80-97)  fL


 


MCH   28   (27-31)  pg


 


MCHC   33   (31-36)  g/dl


 


RDW   14   (10.5-15)  %


 


Plt Count   315   (150-450)  10^3/ul


 


MPV   7 L   (7.4-10.4)  um3


 


Neut % (Auto)   74.5   (38-83)  %


 


Lymph % (Auto)   14.6 L   (25-47)  %


 


Mono % (Auto)   9.2 H   (1-9)  %


 


Eos % (Auto)   1.3   (0-6)  %


 


Baso % (Auto)   0.4   (0-2)  %


 


Absolute Neuts (auto)   7.8 H   (1.5-7.7)  10^3/ul


 


Absolute Lymphs (auto)   1.5   (1.0-4.8)  10^3/ul


 


Absolute Monos (auto)   1.0 H   (0-0.8)  10^3/ul


 


Absolute Eos (auto)   0.1   (0-0.6)  10^3/ul


 


Absolute Basos (auto)   0   (0-0.2)  10^3/ul


 


Absolute Nucleated RBC   0   10^3/ul


 


Nucleated RBC %   0   


 


INR (Anticoag Therapy)    1.08  (0.89-1.11)  


 


Sodium  132 L    (133-145)  mmol/L


 


Potassium  3.7    (3.5-5.0)  mmol/L


 


Chloride  102    (101-111)  mmol/L


 


Carbon Dioxide  25    (22-32)  mmol/L


 


Anion Gap  5    (2-11)  mmol/L


 


BUN  9    (6-24)  mg/dL


 


Creatinine  0.59    (0.51-0.95)  mg/dL


 


Est GFR ( Amer)  124.6    (>60)  


 


Est GFR (Non-Af Amer)  96.9    (>60)  


 


BUN/Creatinine Ratio  15.3    (8-20)  


 


Glucose  116 H    ()  mg/dL


 


Lactic Acid     (0.5-2.0)  mmol/L


 


Calcium  8.1 L    (8.6-10.3)  mg/dL


 


Total Bilirubin  0.30    (0.2-1.0)  mg/dL


 


AST  10 L    (13-39)  U/L


 


ALT  10    (7-52)  U/L


 


Alkaline Phosphatase  60    ()  U/L


 


Troponin I  0.01    (<0.04)  ng/mL


 


C-Reactive Protein  93.30 H    (< 5.00)  mg/L


 


Total Protein  6.2 L    (6.4-8.9)  g/dL


 


Albumin  3.0 L    (3.2-5.2)  g/dL


 


Globulin  3.2    (2-4)  g/dL


 


Albumin/Globulin Ratio  0.9 L    (1-3)  


 


Influenza A (Rapid)     (Negative)  


 


Influenza B (Rapid)     (Negative)  














  10/18/17 10/18/17 Range/Units





  16:40 18:24 


 


WBC    (3.5-10.8)  10^3/ul


 


RBC    (4.0-5.4)  10^6/ul


 


Hgb    (12.0-16.0)  g/dl


 


Hct    (35-47)  %


 


MCV    (80-97)  fL


 


MCH    (27-31)  pg


 


MCHC    (31-36)  g/dl


 


RDW    (10.5-15)  %


 


Plt Count    (150-450)  10^3/ul


 


MPV    (7.4-10.4)  um3


 


Neut % (Auto)    (38-83)  %


 


Lymph % (Auto)    (25-47)  %


 


Mono % (Auto)    (1-9)  %


 


Eos % (Auto)    (0-6)  %


 


Baso % (Auto)    (0-2)  %


 


Absolute Neuts (auto)    (1.5-7.7)  10^3/ul


 


Absolute Lymphs (auto)    (1.0-4.8)  10^3/ul


 


Absolute Monos (auto)    (0-0.8)  10^3/ul


 


Absolute Eos (auto)    (0-0.6)  10^3/ul


 


Absolute Basos (auto)    (0-0.2)  10^3/ul


 


Absolute Nucleated RBC    10^3/ul


 


Nucleated RBC %    


 


INR (Anticoag Therapy)    (0.89-1.11)  


 


Sodium    (133-145)  mmol/L


 


Potassium    (3.5-5.0)  mmol/L


 


Chloride    (101-111)  mmol/L


 


Carbon Dioxide    (22-32)  mmol/L


 


Anion Gap    (2-11)  mmol/L


 


BUN    (6-24)  mg/dL


 


Creatinine    (0.51-0.95)  mg/dL


 


Est GFR ( Amer)    (>60)  


 


Est GFR (Non-Af Amer)    (>60)  


 


BUN/Creatinine Ratio    (8-20)  


 


Glucose    ()  mg/dL


 


Lactic Acid  1.0   (0.5-2.0)  mmol/L


 


Calcium    (8.6-10.3)  mg/dL


 


Total Bilirubin    (0.2-1.0)  mg/dL


 


AST    (13-39)  U/L


 


ALT    (7-52)  U/L


 


Alkaline Phosphatase    ()  U/L


 


Troponin I    (<0.04)  ng/mL


 


C-Reactive Protein    (< 5.00)  mg/L


 


Total Protein    (6.4-8.9)  g/dL


 


Albumin    (3.2-5.2)  g/dL


 


Globulin    (2-4)  g/dL


 


Albumin/Globulin Ratio    (1-3)  


 


Influenza A (Rapid)   Negative  (Negative)  


 


Influenza B (Rapid)   Negative  (Negative)  











Result Diagrams: 


 10/18/17 16:40





 10/18/17 16:40


Lab Statement: Any lab studies that have been ordered have been reviewed, and 

results considered in the medical decision making process.





- Radiology


  ** CXR


Radiology Interpretation Completed By: Radiologist - 1. The constellation of 

findings favors bronchopneumonia with new areas of consolidation compared with 

the September 14, 2017 exam. 2. Stigmata of chronic obstructive pulmonary 

disease. ED physician has reviewed this radiology report and agrees.





- EKG


  ** 1508


Cardiac Rate: Tachycardia - 117bpm


EKG Rhythm: Sinus Tachycardia


EKG Interpretation: No STEMI


EKG Comparison: Other - Tachycardic from 9/20/17





Complex Multi-Symp Course/Dx


Course Of Treatment: Ms. Javier met sepsis criteria on arrival and was treated 

with fluids and antibiotics.  She improved and is being admitted to the 

hospitalist service.





- Diagnoses


Provider Diagnoses: 


 Sepsis, Pneumonia








- Physician Notifications


Discussed Care Of Patient With: Audra Alfaro - Hospitalist


Time Discussed With Above Provider: 18:05


Instructed by Provider To: Admit As Inpatient





- Critical Care Time


Critical Care Time: 30-74 min





Discharge





- Discharge Plan


Condition: Stable


Disposition: ADMITTED TO [facility]


Referrals: 


Mariela Cobb MD [Primary Care Provider] - 





The documentation as recorded by the Med hollis Nilda accurately 

reflects the service I personally performed and the decisions made by me, Martell Shields MD.

## 2017-10-18 NOTE — RAD
Indication: Shortness of breath, chest pain, cough. Shaking chills. Pneumonia 3 weeks ago.

History of tobacco use.



Comparison: September 20, 2017 CT and September 14, 2017 chest radiograph.



Technique: Upright AP 1536 hours



Report: Elevated lung volumes and both diffuse mild prominence of the interstitial

markings and patchy rarefaction of the mid to upper lung zone interstitial markings.  New

airspace consolidation involving the anterior segment of the RIGHT upper lobe extending to

the minor fissure. Negative for volume loss to suggest atelectasis. Additional mild

alveolar consolidation in the periphery of the LEFT midlung zone and at the bilateral lung

bases. Negative for pleural effusions. Negative for pneumothorax. The heart, pulmonary

vasculature, and mediastinal contours are unremarkable.



IMPRESSION: 

1. The constellation of findings favors bronchopneumonia with new areas of consolidation

compared with the September 14, 2017 exam.

2. Stigmata of chronic obstructive pulmonary disease.

## 2017-10-19 LAB
ANION GAP SERPL CALC-SCNC: 6 MMOL/L (ref 2–11)
BUN SERPL-MCNC: 7 MG/DL (ref 6–24)
BUN/CREAT SERPL: 13 (ref 8–20)
CALCIUM SERPL-MCNC: 8.8 MG/DL (ref 8.6–10.3)
CHLORIDE SERPL-SCNC: 107 MMOL/L (ref 101–111)
GLUCOSE SERPL-MCNC: 152 MG/DL (ref 70–100)
HCO3 SERPL-SCNC: 24 MMOL/L (ref 22–32)
HCT VFR BLD AUTO: 36 % (ref 35–47)
HGB BLD-MCNC: 11.7 G/DL (ref 12–16)
MCH RBC QN AUTO: 28 PG (ref 27–31)
MCHC RBC AUTO-ENTMCNC: 32 G/DL (ref 31–36)
MCV RBC AUTO: 86 FL (ref 80–97)
POTASSIUM SERPL-SCNC: 4.1 MMOL/L (ref 3.5–5)
RBC # BLD AUTO: 4.22 10^6/UL (ref 4–5.4)
SODIUM SERPL-SCNC: 137 MMOL/L (ref 133–145)
WBC # BLD AUTO: 7.8 10^3/UL (ref 3.5–10.8)

## 2017-10-19 RX ADMIN — OMEPRAZOLE SCH MG: 20 CAPSULE, DELAYED RELEASE ORAL at 06:42

## 2017-10-19 RX ADMIN — HEPARIN SODIUM SCH UNITS: 5000 INJECTION INTRAVENOUS; SUBCUTANEOUS at 13:04

## 2017-10-19 RX ADMIN — IPRATROPIUM BROMIDE AND ALBUTEROL SULFATE SCH: .5; 3 SOLUTION RESPIRATORY (INHALATION) at 01:20

## 2017-10-19 RX ADMIN — PREDNISONE SCH MG: 20 TABLET ORAL at 08:24

## 2017-10-19 RX ADMIN — MIRTAZAPINE SCH MG: 15 TABLET, FILM COATED ORAL at 22:00

## 2017-10-19 RX ADMIN — MOMETASONE FUROATE AND FORMOTEROL FUMARATE DIHYDRATE SCH PUFF: 200; 5 AEROSOL RESPIRATORY (INHALATION) at 11:56

## 2017-10-19 RX ADMIN — HEPARIN SODIUM SCH UNITS: 5000 INJECTION INTRAVENOUS; SUBCUTANEOUS at 06:42

## 2017-10-19 RX ADMIN — MOMETASONE FUROATE AND FORMOTEROL FUMARATE DIHYDRATE SCH PUFF: 200; 5 AEROSOL RESPIRATORY (INHALATION) at 20:46

## 2017-10-19 RX ADMIN — IPRATROPIUM BROMIDE AND ALBUTEROL SULFATE SCH NEB: .5; 3 SOLUTION RESPIRATORY (INHALATION) at 12:30

## 2017-10-19 RX ADMIN — IPRATROPIUM BROMIDE AND ALBUTEROL SULFATE SCH NEB: .5; 3 SOLUTION RESPIRATORY (INHALATION) at 18:13

## 2017-10-19 RX ADMIN — HEPARIN SODIUM SCH UNITS: 5000 INJECTION INTRAVENOUS; SUBCUTANEOUS at 22:00

## 2017-10-19 RX ADMIN — IPRATROPIUM BROMIDE AND ALBUTEROL SULFATE SCH NEB: .5; 3 SOLUTION RESPIRATORY (INHALATION) at 07:46

## 2017-10-19 NOTE — PN
Subjective


Date of Service: 10/19/17


Interval History: 


HOSPITALIST PROGRESS NOTE


Patient seen and examined at bedside.


She feels a little better today, but still has dyspnea and moist cough.


A lot of anxiety due to family turmoil (financial issues with her granddaughter/

fiancee) certainly playing a role on her symptoms.


Family History: Unchanged from Admission


Social History: Unchanged from Admission


Past Medical History: Unchanged from Admission





Objective


Active Medications: 








Acetaminophen (Tylenol Tab*)  650 mg PO Q4H PRN


   PRN Reason: FEVER/PAIN


Albuterol (Ventolin 2.5 Mg/3 Ml Neb.Sol*)  2.5 mg INH Q2H PRN


   PRN Reason: SOB/WHEEZING


Albuterol/Ipratropium (Duoneb (Albuterol 2.5 Mg/Ipratropium 0.5 Mg))  1 neb INH 

[id number] AWAKE [facility]


   Last Admin: 10/19/17 12:30 Dose:  1 neb


Heparin Sodium (Porcine) (Heparin Vial(*))  5,000 units SUBCUT Q8HR [facility]


   Last Admin: 10/19/17 06:42 Dose:  5,000 units


Levofloxacin/Dextrose (Levaquin 750 Mg Ivpremix(*))  750 mg in 150 mls @ 100 mls

/hr IVPB Q24H [facility]


Mirtazapine (Remeron Tab*)  15 mg PO BEDTIME [facility]


   Last Admin: 10/18/17 20:51 Dose:  15 mg


Mometasone Furoate/Formoterol Fumar (Dulera 200/5 Mdi*)  2 puff INH BID [facility]


   Last Admin: 10/19/17 11:56 Dose:  2 puff


Omeprazole (Prilosec Cap*)  20 mg PO DAILY@0600 [facility]


   Last Admin: 10/19/17 06:42 Dose:  20 mg


Prednisone (Deltasone Tab*)  60 mg PO DAILY [facility]


   Last Admin: 10/19/17 08:24 Dose:  60 mg


Zolpidem Tartrate (Ambien Tab*)  5 mg PO BEDTIME PRN


   PRN Reason: SLEEP


   Last Admin: 10/18/17 20:51 Dose:  5 mg








Vital Signs











  10/19/17 10/19/17 10/19/17





  08:00 11:02 12:32


 


Temperature  98.0 F 


 


Pulse Rate  96 90


 


Respiratory 17 17 16





Rate   


 


Blood Pressure  123/57 





(mmHg)   


 


O2 Sat by Pulse 93 93 94





Oximetry   











Oxygen Devices in Use Now: None


Appearance: Pleasant elderly lady sitting up in bed in NAD, but anxious.


Eyes: No Scleral Icterus


Ears/Nose/Mouth/Throat: Mucous Membranes Moist


Neck: Trachea Midline


Respiratory: Symmetrical Chest Expansion and Respiratory Effort, - - BS+ 

bilaterally with scattered wheezes


Cardiovascular: RRR - Normal S1 and S2


Abdominal: NL Sounds; No Tenderness; No Distention


Extremities: No Edema


Neurological: Alert and Oriented x 3, NL Muscle Strength and Tone


Lines/Tubes/Other Access: Clean, Dry and Intact Peripheral IV


Nutrition: Taking PO's


Result Diagrams: 


 10/19/17 06:17





 10/19/17 06:17





Assess/Plan/Problems-Billing


Assessment: 


Mrs. Hinojosa is an 86yo F with PMH of tobacco abuse, COPD, recent admission for 

pneumonia who presented to ED with c/o dyspnea and cough, found to have 

bronchopneumonia.








- Patient Problems


(1) COPD exacerbation


Comment: - Secondary to pneumonia.


- Continue bronchodilators and steroids.   





(2) Bronchopneumonia


Comment: - CxR shows resolution of RLL infiltrate present on prior admission in 

September, but shows new RUL and left mid lung infiltrates compatible with 

bronchopneumonia.


- Continue Levofloxacin #2.   





(3) Tobacco abuse counseling


Comment: - Patient advised about importance of quitting. She understand if she 

continues to smoke she's at risk for worsening lung disease, heart disease, 

cancer, and death. She verbalizes understanding, but states she's not 

interested now. "I lived to be 85, I'm not stopping now".


- Declines nicotine supplementation.   





(4) Anxiety


Comment: - Exacerbating all her symptoms.


- Patient doesn't see an end to her family conflicts any time soon. Advised to 

pursue counseling as outpatient - will consider.   





(5) DVT prophylaxis


Comment: - SQ heparin.   





(6) Full code status





Status and Disposition: 


Inpatient for management of COPD exacerbation and bronchopneumonia requiring >

48h for stabilization.

## 2017-10-20 VITALS — DIASTOLIC BLOOD PRESSURE: 70 MMHG | SYSTOLIC BLOOD PRESSURE: 134 MMHG

## 2017-10-20 RX ADMIN — IPRATROPIUM BROMIDE AND ALBUTEROL SULFATE SCH: .5; 3 SOLUTION RESPIRATORY (INHALATION) at 00:56

## 2017-10-20 RX ADMIN — HEPARIN SODIUM SCH UNITS: 5000 INJECTION INTRAVENOUS; SUBCUTANEOUS at 06:14

## 2017-10-20 RX ADMIN — OMEPRAZOLE SCH MG: 20 CAPSULE, DELAYED RELEASE ORAL at 06:15

## 2017-10-20 RX ADMIN — IPRATROPIUM BROMIDE AND ALBUTEROL SULFATE SCH: .5; 3 SOLUTION RESPIRATORY (INHALATION) at 09:21

## 2017-10-20 RX ADMIN — PREDNISONE SCH MG: 20 TABLET ORAL at 08:15

## 2017-10-20 RX ADMIN — MOMETASONE FUROATE AND FORMOTEROL FUMARATE DIHYDRATE SCH PUFF: 200; 5 AEROSOL RESPIRATORY (INHALATION) at 08:14

## 2017-10-21 NOTE — DS
CC:  Dr. Cobb; Dr. Gonzales

 

DISCHARGE SUMMARY:

 

DATE OF ADMISSION:  10/18/17

 

DATE OF DISCHARGE:  10/20/17

 

PRIMARY CARE PROVIDER:  Dr. Cobb.

 

PULMONOLOGIST:  Dr. Gonzales.

 

DISCHARGE DIAGNOSES:

1.  Acute chronic obstructive pulmonary disease exacerbation.

2.  Bronchial pneumonia.

3.  Anxiety.

 

SECONDARY DIAGNOSES:

1.  Recent admission for pneumonia in September.

2.  Chronic obstructive pulmonary disease.

3.  Tobacco abuse.

4.  Macular degeneration.

 

MEDICATION LIST:

1.  DuoNeb one neb inhaled q.4 hours p.r.n. shortness of breath.

2.  Symbicort 160/4.5 two puffs inhaled b.i.d.

3.  Ambien 5 mg p.o. at bedtime as needed for insomnia.

4.  Mirtazapine 15 mg p.o. at bedtime.

5.  Spiriva one capsule inhaled daily.

6.  Omeprazole 20 mg p.o. daily at 6 a.m.

7.  Albuterol HFA two puffs inhaled q.4 hours p.r.n. shortness of breath.

8.  Prednisone taper as follows; 40 mg p.o. daily for 3 days; then 30 mg for 3 days; 20 mg 3 days; 1
0 mg 3 days; 5 mg 3 days and stop.

9.  Levofloxacin 750 mg p.o. daily for 5 more days.

 

HOSPITAL COURSE:  Ms. Javier is an 85-year-old lady with a past medical history stated above that p
resented to the emergency room with complaints of cough, shortness of breath, and chills.  Of note i
s the fact that the patient was admitted to [facility] from 10/15/17 to 10/17/17, with right lower lobe pne
umonia.  At that time, she completed 7 days of levofloxacin.  She returned to [facility] on 10/20/17 with c
omplaints of chest pain that was diagnosed as musculoskeletal.  Her stress test was negative.

 

The patient is under a great amount of stress and anxiety due to financial issues involving her gran
ddauter and granddaughter's fiance.  She continues to smoke and has no plan of cutting it down.

 

At this time, she was found to have resolution of her right lower lobe infiltrate, but she was found
 to have new airspace consolidation involving the anterior segment of right upper lobe and alveolar 
consolidation in the periphery of the left midlung zone suggestive of bronchial pneumonia with new a
reas of consolidation compared with her prior x-ray from 09/14/17.

 

The patient was started on bronchodilators, steroids, antibiotics, and she had improvement of her sy
mptoms.  She says that her dyspnea is back at baseline and she feels well enough to go home.

 

I believe her major issue at this point is the anxiety related to her family situation.  I believe t
hat the patient would benefit of therapy and counseling as outpatient.  She is open to the idea.  Nava cabrera states she is a  and she will make arrangements to have counseling as outpatient.

 

Once again, she was advised about the importance of quitting tobacco.  She is aware that if she cont
inues to smoke, she is at risk for cancer, stroke, heart attack, vascular disease, worsening lung di
sease and death.  She states that she has smoked to age 85 and she is not planning to stop now, even
 though she acknowledges all the risks aforementioned.

 

She is medically stable for discharge at this time.

 

PHYSICAL EXAMINATION:  Vital Signs:  Temperature 97.3, heart rate is 80, respiratory rate is 18, oxy
gen saturation 95% on room air, blood pressure 134/70. General:  The patient is a pleasant elderly l
myrtle, sitting up in bed in no acute distress.  CVS:  Normal S1, S2.  Regular rate and rhythm.  Chest:
  Breath sounds present bilaterally, decreased with no added sounds.  Abdomen:  Soft, nontender and 
nondistended.  Bowel sounds are present.  Extremities:  No edema.  Neurologic:  She is alert and adelita
ented x3.  She is able to move all 4 extremities.

 

DIET:  Regular diet.

 

ACTIVITY:  As tolerated.

 

DISPOSITION:  To home.

 

STATUS WHILE IN THE HOSPITAL:  Inpatient.

 

Please keep in mind this is a summarized version of this patient's hospital stay. If you need more i
nformation, please feel free to call me at [phone] or please obtain the full medical records.

 

TIME SPENT:  Approximately 45 minutes were spent to complete this discharge.

 

 [id number]/CPS #: [id number]

## 2019-03-24 ENCOUNTER — HOSPITAL ENCOUNTER (EMERGENCY)
Dept: HOSPITAL 25 - ED | Age: 84
Discharge: HOME | End: 2019-03-24
Payer: MEDICARE

## 2019-03-24 VITALS — SYSTOLIC BLOOD PRESSURE: 101 MMHG | DIASTOLIC BLOOD PRESSURE: 54 MMHG

## 2019-03-24 DIAGNOSIS — F17.210: ICD-10-CM

## 2019-03-24 DIAGNOSIS — Z88.0: ICD-10-CM

## 2019-03-24 DIAGNOSIS — J44.1: Primary | ICD-10-CM

## 2019-03-24 LAB
ALBUMIN SERPL BCG-MCNC: 3.9 G/DL (ref 3.2–5.2)
ALBUMIN/GLOB SERPL: 1.3 {RATIO} (ref 1–3)
ALP SERPL-CCNC: 71 U/L (ref 34–104)
ALT SERPL W P-5'-P-CCNC: 9 U/L (ref 7–52)
ANION GAP SERPL CALC-SCNC: 8 MMOL/L (ref 2–11)
AST SERPL-CCNC: 12 U/L (ref 13–39)
BASOPHILS # BLD AUTO: 0 10^3/UL (ref 0–0.2)
BUN SERPL-MCNC: 20 MG/DL (ref 6–24)
BUN/CREAT SERPL: 25.3 (ref 8–20)
CALCIUM SERPL-MCNC: 9.3 MG/DL (ref 8.6–10.3)
CHLORIDE SERPL-SCNC: 100 MMOL/L (ref 101–111)
EOSINOPHIL # BLD AUTO: 0 10^3/UL (ref 0–0.6)
FLUAV RNA SPEC QL NAA+PROBE: NEGATIVE
FLUBV RNA SPEC QL NAA+PROBE: NEGATIVE
GLOBULIN SER CALC-MCNC: 3.1 G/DL (ref 2–4)
GLUCOSE SERPL-MCNC: 122 MG/DL (ref 70–100)
HCO3 SERPL-SCNC: 24 MMOL/L (ref 22–32)
HCT VFR BLD AUTO: 41 % (ref 33–41)
HGB BLD-MCNC: 13.2 G/DL (ref 12–16)
LYMPHOCYTES # BLD AUTO: 1.2 10^3/UL (ref 1–4.8)
MCH RBC QN AUTO: 28 PG (ref 27–31)
MCHC RBC AUTO-ENTMCNC: 33 G/DL (ref 31–36)
MCV RBC AUTO: 87 FL (ref 80–97)
MONOCYTES # BLD AUTO: 1.1 10^3/UL (ref 0–0.8)
NEUTROPHILS # BLD AUTO: 14.8 10^3/UL (ref 1.5–7.7)
NRBC # BLD AUTO: 0 10^3/UL
NRBC BLD QL AUTO: 0
PLATELET # BLD AUTO: 204 10^3/UL (ref 150–450)
POTASSIUM SERPL-SCNC: 4.1 MMOL/L (ref 3.5–5)
PROT SERPL-MCNC: 7 G/DL (ref 6.4–8.9)
RBC # BLD AUTO: 4.67 10^6 /UL (ref 3.7–4.87)
SODIUM SERPL-SCNC: 132 MMOL/L (ref 135–145)
TROPONIN I SERPL-MCNC: 0 NG/ML (ref ?–0.04)
WBC # BLD AUTO: 17.2 10^3/UL (ref 3.5–10.8)

## 2019-03-24 PROCEDURE — 96361 HYDRATE IV INFUSION ADD-ON: CPT

## 2019-03-24 PROCEDURE — 85025 COMPLETE CBC W/AUTO DIFF WBC: CPT

## 2019-03-24 PROCEDURE — 80053 COMPREHEN METABOLIC PANEL: CPT

## 2019-03-24 PROCEDURE — 86140 C-REACTIVE PROTEIN: CPT

## 2019-03-24 PROCEDURE — 96365 THER/PROPH/DIAG IV INF INIT: CPT

## 2019-03-24 PROCEDURE — 36415 COLL VENOUS BLD VENIPUNCTURE: CPT

## 2019-03-24 PROCEDURE — 83605 ASSAY OF LACTIC ACID: CPT

## 2019-03-24 PROCEDURE — 87040 BLOOD CULTURE FOR BACTERIA: CPT

## 2019-03-24 PROCEDURE — 99283 EMERGENCY DEPT VISIT LOW MDM: CPT

## 2019-03-24 PROCEDURE — 71046 X-RAY EXAM CHEST 2 VIEWS: CPT

## 2019-03-24 PROCEDURE — 84484 ASSAY OF TROPONIN QUANT: CPT

## 2019-03-24 NOTE — ED
Respiratory





- HPI Summary


HPI Summary: 


Patient is a 89-year-old female smoker with a history of COPD and pneumonia 

presenting to the ED with concern for pneumonia.  She states for the past 3 days

, she has been coughing with productive sputum.  She denies any fevers or sweats

, however is endorsing chills.  She states this is similar to her other PNA 

episodes.  She continues to smoke.  She denies any weakness.  Denies any 

urinary symptoms, abdominal pain.  She denies any shortness of breath and does 

not use oxygen at home.  She does have Spiriva at home which she takes 

intermittently.  She does not use nebulized treatments at home.  She states she 

has been admitted for PNA several times in the past.








- History of Current Complaint


Chief Complaint: WilmerirmaIsadora


Stated Complaint: I THINK I HAVE PNEUMONIA PER PT


Time Seen by Provider: 03/24/19 08:58


Hx Obtained From: Patient


Onset/Duration: Gradual Onset


Timing: Constant


Initial Severity: Moderate


Current Severity: Moderate


Pain Intensity: 0


Character: Cough (Productive)


Sputum Amount: Moderate


Sputum Color: Yellow, Green


Aggravating Factor(s): URI, Passive Smoke Exposure, Other - smoking hx


Alleviating Factor(s): Nothing





- Risk Factors


Status Asthmaticus Risk Factors: Smoking


Pulmonary Embolism Risk Factors: Smoking


Cardiac Risk Factors: Smoking


Pseudomonas Risk Factors: Repeated Antibiotics Past 3 Months


Tuberculosis Risk Factors: Chronic Respiratory Failure, Smoking





- Allergy/Home Medications


Allergies/Adverse Reactions: 


 Allergies











Allergy/AdvReac Type Severity Reaction Status Date / Time


 


Penicillins Allergy  Shortness Verified 03/24/19 08:55





   of Breath  














PMH/Surg Hx/FS Hx/Imm Hx


Previously Healthy: Yes


Endocrine/Hematology History: 


   Denies: Hx Diabetes


Cardiovascular History: Reports: Hx Angina


   Denies: Hx Congestive Heart Failure, Hx Coronary Artery Disease, Hx 

Hypercholesterolemia, Hx Hypertension, Hx Myocardial Infarction


Respiratory History: Reports: Hx Chronic Obstructive Pulmonary Disease (COPD), 

Hx Pneumonia, Other Respiratory Problems/Disorders - current SOB with exertion


 History: 


   Denies: Hx Renal Disease


Sensory History: Reports: Hx Cataracts - cataract removal, Hx Contacts or 

Glasses, Hx Macular Degeneration, Hx Hearing Problem


   Denies: Hx Hearing Aid


Opthamlomology History: Reports: Hx Cataracts - cataract removal, Hx Contacts 

or Glasses, Hx Macular Degeneration


Neurological History: Reports: Other Neuro Impairments/Disorders - familial 

tremors


Psychiatric History: Reports: Hx Depression





- Surgical History


Surgery Procedure, Year, and Place: gallbladder removal, appy, hysterectomy





- Immunization History


Date of Tetanus Vaccine: Unknown


Hx Pertussis Vaccination: No


Immunizations Up to Date: Yes


Infectious Disease History: No


Infectious Disease History: 


   Denies: Traveled Outside the [place] in Last 30 Days





- Family History


Known Family History: Positive: Other - POS: DM, [place]


   Negative: Hypertension, Diabetes





- Social History


Occupation: Unemployed


Lives: With Family


Alcohol Use: Rare


Hx Substance Use: No


Substance Use Type: Reports: None


Hx Tobacco Use: Yes


Smoking Status (MU): Heavy Every Day Tobacco Smoker


Type: Cigarettes


Amount Used/How Often: half pack a day (last smoked on 9/18)


Have You Smoked in the Last Year: Yes





Review of Systems


Constitutional: Negative


Negative: Fever, Chills, Fatigue, Skin Diaphoresis


Negative: Palpitations, Chest Pain


Positive: Cough - with production.  Negative: Shortness Of Breath


Genitourinary: Negative


Positive: no symptoms reported, see HPI


Negative: Arthralgia, Myalgia


Skin: Negative


Neurological: Negative


All Other Systems Reviewed And Are Negative: Yes





Physical Exam


Triage Information Reviewed: Yes


Vital Signs On Initial Exam: 


 Initial Vitals











Temp Pulse Resp BP Pulse Ox


 


 99.8 F   117   25   127/69   95 


 


 03/24/19 08:46  03/24/19 08:46  03/24/19 08:46  03/24/19 08:46  03/24/19 08:46











Vital Signs Reviewed: Yes


Appearance: Positive: Well-Appearing, Well-Nourished


Skin: Positive: Warm, Skin Color Reflects Adequate Perfusion


Head/Face: Positive: Normal Head/Face Inspection


Eyes: Positive: EOMI, AB, Conjunctiva Clear


Neck: Positive: Supple, No Lymphadenopathy


Respiratory/Lung Sounds: Positive: Rhonchi - throughout


Cardiovascular: Positive: Pulses are Symmetrical in both Upper and Lower 

Extremities, Tachycardia.  Negative: Leg Edema Left, Leg Edema Right


Musculoskeletal: Positive: Normal, Strength/ROM Intact


Neurological: Positive: Speech Normal


Psychiatric: Positive: Normal, Affect/Mood Appropriate





Diagnostics





- Vital Signs


 Vital Signs











  Temp Pulse Resp BP Pulse Ox


 


 03/24/19 08:46  99.8 F  117  25  127/69  95














- Laboratory


Result Diagrams: 


 03/24/19 09:40





 03/24/19 09:40


Lab Statement: Any lab studies that have been ordered have been reviewed, and 

results considered in the medical decision making process.





Disposition





- Course


Course Of Treatment: During this course treatment, the patient is evaluated for 

PNA.  She is afebrile.  Denies any sweats or chills, moment.  States she has a 

productive cough 3 days and feels like her past episodes of PNA.  She denies 

any SOB.  VS 99.8, 117, 25, 95%.  Septic workup started with fluids, levaquin 

and labs.  Labs show an increased white count of 17,000 and an elevated CRP.  

Chest x-ray shows no acute cardiopulmonary disease.  Patient is given a DuoNeb 

with good effect.  She continues to sign any shortness of breath.  She 

continues to deny any abdominal pain or CP.   Influenza negative.  Sodium of 

132.  CRP 69.55.  Discussed these findings with the patient.  Patient states 

she would like to be discharged home.  She is given Levaquin as she continues 

to smoke and has COPD.  This is protective and prophylactic treatment due to 

her recent increase of coughing.  She states she has sprained home, however I 

have prescribed her an albuterol inhaler.  She states she will return if she 

has any worsening or changing symptoms.  Vital signs prior to discharge 99.6, 86

, respirations 16, 92% on room air, 101/54.  Patient able to ambulate and 

continues to maintain a saturation of 92%.





- Differential Dx - Cardiopulmonary


Differential Diagnoses - Cardiopulmonary: Other - Viral syndrome, shortness of 

breath, COPD exacerbation, bronchitis





- Diagnoses


Provider Diagnoses: 


 Bronchitis, COPD exacerbation








- Critical Care Time


Critical Care Time: 30-74 min





Discharge





- Sign-Out/Discharge


Documenting (check all that apply): Patient Departure


Patient Received Moderate/Deep Sedation with Procedure: No





- Discharge Plan


Condition: Stable


Disposition: HOME


Prescriptions: 


Albuterol HFA INHALER* [Ventolin HFA Inhaler*] 1 puff INH Q4H PRN #1 mdi


 PRN Reason: Shortness Of Breath


Levofloxacin TAB* [Levaquin TAB*] 500 mg PO DAILY #5 tab


Patient Education Materials:  Acute Bronchitis (ED)


Referrals: 


Mariela Cobb MD [Primary Care Provider] - 


Additional Instructions: 


Levaquin once daily 5 days, start this tomorrow


Albuterol inhaler as needed for any shortness of breath


Drink plenty of fluids








- Billing Disposition and Condition


Condition: STABLE


Disposition: Home

## 2019-06-28 ENCOUNTER — HOSPITAL ENCOUNTER (EMERGENCY)
Dept: HOSPITAL 25 - ED | Age: 84
Discharge: LEFT BEFORE DISCHARGE | End: 2019-06-28
Payer: MEDICARE

## 2019-06-28 VITALS — SYSTOLIC BLOOD PRESSURE: 104 MMHG | DIASTOLIC BLOOD PRESSURE: 56 MMHG

## 2019-06-28 DIAGNOSIS — Z53.21: Primary | ICD-10-CM

## 2019-06-28 LAB
ALBUMIN SERPL BCG-MCNC: 3.8 G/DL (ref 3.2–5.2)
ALBUMIN/GLOB SERPL: 0.9 {RATIO} (ref 1–3)
ALP SERPL-CCNC: 86 U/L (ref 34–104)
ALT SERPL W P-5'-P-CCNC: 9 U/L (ref 7–52)
ANION GAP SERPL CALC-SCNC: 10 MMOL/L (ref 2–11)
APTT PPP: 42.6 SECONDS (ref 26–38)
AST SERPL-CCNC: 11 U/L (ref 13–39)
BASOPHILS # BLD AUTO: 0.1 10^3/UL (ref 0–0.2)
BUN SERPL-MCNC: 15 MG/DL (ref 6–24)
BUN/CREAT SERPL: 25.9 (ref 8–20)
CALCIUM SERPL-MCNC: 9.6 MG/DL (ref 8.6–10.3)
CHLORIDE SERPL-SCNC: 97 MMOL/L (ref 101–111)
CK MB SERPL-MCNC: 1 NG/ML (ref 0.6–6.3)
CK SERPL-CCNC: 21 U/L (ref 10–223)
EOSINOPHIL # BLD AUTO: 0.2 10^3/UL (ref 0–0.6)
GLOBULIN SER CALC-MCNC: 4.1 G/DL (ref 2–4)
GLUCOSE SERPL-MCNC: 110 MG/DL (ref 70–100)
HCO3 SERPL-SCNC: 26 MMOL/L (ref 22–32)
HCT VFR BLD AUTO: 40 % (ref 35–47)
HGB BLD-MCNC: 12.7 G/DL (ref 12–16)
INR PPP/BLD: 1.13 (ref 0.82–1.09)
LYMPHOCYTES # BLD AUTO: 1.9 10^3/UL (ref 1–4.8)
MCH RBC QN AUTO: 27 PG (ref 27–31)
MCHC RBC AUTO-ENTMCNC: 32 G/DL (ref 31–36)
MCV RBC AUTO: 84 FL (ref 80–97)
MONOCYTES # BLD AUTO: 0.9 10^3/UL (ref 0–0.8)
NEUTROPHILS # BLD AUTO: 10.2 10^3/UL (ref 1.5–7.7)
NRBC # BLD AUTO: 0 10^3/UL
NRBC BLD QL AUTO: 0.1
PLATELET # BLD AUTO: 322 10^3/UL (ref 150–450)
POTASSIUM SERPL-SCNC: 3.9 MMOL/L (ref 3.5–5)
PROT SERPL-MCNC: 7.9 G/DL (ref 6.4–8.9)
RBC # BLD AUTO: 4.77 10^6 /UL (ref 3.7–4.87)
SODIUM SERPL-SCNC: 133 MMOL/L (ref 135–145)
TROPONIN I SERPL-MCNC: 0 NG/ML (ref ?–0.04)
WBC # BLD AUTO: 13.3 10^3/UL (ref 3.5–10.8)

## 2019-06-28 PROCEDURE — 85610 PROTHROMBIN TIME: CPT

## 2019-06-28 PROCEDURE — 84484 ASSAY OF TROPONIN QUANT: CPT

## 2019-06-28 PROCEDURE — 86140 C-REACTIVE PROTEIN: CPT

## 2019-06-28 PROCEDURE — 82553 CREATINE MB FRACTION: CPT

## 2019-06-28 PROCEDURE — 80053 COMPREHEN METABOLIC PANEL: CPT

## 2019-06-28 PROCEDURE — 87040 BLOOD CULTURE FOR BACTERIA: CPT

## 2019-06-28 PROCEDURE — 99281 EMR DPT VST MAYX REQ PHY/QHP: CPT

## 2019-06-28 PROCEDURE — 82550 ASSAY OF CK (CPK): CPT

## 2019-06-28 PROCEDURE — 71046 X-RAY EXAM CHEST 2 VIEWS: CPT

## 2019-06-28 PROCEDURE — 85730 THROMBOPLASTIN TIME PARTIAL: CPT

## 2019-06-28 PROCEDURE — 36415 COLL VENOUS BLD VENIPUNCTURE: CPT

## 2019-06-28 PROCEDURE — 83880 ASSAY OF NATRIURETIC PEPTIDE: CPT

## 2019-06-28 PROCEDURE — 83605 ASSAY OF LACTIC ACID: CPT

## 2019-06-28 PROCEDURE — 85025 COMPLETE CBC W/AUTO DIFF WBC: CPT

## 2019-06-28 NOTE — PN
Progress Note





- Progress Note


Date of Service: 06/28/19 - 19:59


Note: 





Went to ED waiting area to evaluate pt and pt and daughter left without being 

seen by physician.  Triage nurse had describe patient's symptoms, and orders 

were placed based on the triage evaluation, however pt was not seen by the 

physician. Per the nurse's note, pt left with daughter, ambulatory and in no 

acute distress. Pt's wbc count was 13.3, her CRP was elevated at 105.53, Her 

INR was 1.13  and her BNP was  31 . Her CXR showed hyperinflation c/w COPD and 

no active cardiopulmonary disease. I will call pt and her daughter to discuss 

these results.  


2100: Called pt and she answered, but I had to end the call abruptly to talk 

with trauma surgeon for another patient.


2200: I called pt again.  Son answered the phone.  Pt came to the phone and I 

spoke with her.  She stated she felt fine.  I advised her no sign of pneumonia 

based on the tests we did.  Pt was reassured. Pt stated she was ready to go to 

sleep.  She spoke in full sentences, did not sound short of breath.  She stated 

she had her inhalers, and I advised her to continue those.  Pt states she has 

Dr. Cobb, and I advised her to tell Dr. Cobb that she had these studies done

, and to call Dr. Cobb, or return to the ER, if she has any new or worsening 

symptoms. 


ELAINE GOODMAN, 6/29/19 [id number].

## 2019-09-17 ENCOUNTER — HOSPITAL ENCOUNTER (EMERGENCY)
Dept: HOSPITAL 25 - ED | Age: 84
LOS: 1 days | Discharge: HOME | End: 2019-09-18
Payer: MEDICARE

## 2019-09-17 DIAGNOSIS — J44.9: Primary | ICD-10-CM

## 2019-09-17 DIAGNOSIS — Z79.899: ICD-10-CM

## 2019-09-17 DIAGNOSIS — F17.210: ICD-10-CM

## 2019-09-17 DIAGNOSIS — Z88.0: ICD-10-CM

## 2019-09-17 DIAGNOSIS — R94.31: ICD-10-CM

## 2019-09-17 DIAGNOSIS — R21: ICD-10-CM

## 2019-09-17 PROCEDURE — 84484 ASSAY OF TROPONIN QUANT: CPT

## 2019-09-17 PROCEDURE — 83735 ASSAY OF MAGNESIUM: CPT

## 2019-09-17 PROCEDURE — 81015 MICROSCOPIC EXAM OF URINE: CPT

## 2019-09-17 PROCEDURE — 99283 EMERGENCY DEPT VISIT LOW MDM: CPT

## 2019-09-17 PROCEDURE — 71046 X-RAY EXAM CHEST 2 VIEWS: CPT

## 2019-09-17 PROCEDURE — 80053 COMPREHEN METABOLIC PANEL: CPT

## 2019-09-17 PROCEDURE — 36415 COLL VENOUS BLD VENIPUNCTURE: CPT

## 2019-09-17 PROCEDURE — 86140 C-REACTIVE PROTEIN: CPT

## 2019-09-17 PROCEDURE — 81003 URINALYSIS AUTO W/O SCOPE: CPT

## 2019-09-17 PROCEDURE — 87086 URINE CULTURE/COLONY COUNT: CPT

## 2019-09-17 PROCEDURE — 85025 COMPLETE CBC W/AUTO DIFF WBC: CPT

## 2019-09-17 PROCEDURE — 93005 ELECTROCARDIOGRAM TRACING: CPT

## 2019-09-18 VITALS — DIASTOLIC BLOOD PRESSURE: 61 MMHG | SYSTOLIC BLOOD PRESSURE: 126 MMHG

## 2019-09-18 LAB
ALBUMIN SERPL BCG-MCNC: 4.4 G/DL (ref 3.2–5.2)
ALBUMIN/GLOB SERPL: 1.3 {RATIO} (ref 1–3)
ALP SERPL-CCNC: 98 U/L (ref 34–104)
ALT SERPL W P-5'-P-CCNC: 12 U/L (ref 7–52)
ANION GAP SERPL CALC-SCNC: 8 MMOL/L (ref 2–11)
AST SERPL-CCNC: 15 U/L (ref 13–39)
BASOPHILS # BLD AUTO: 0.1 10^3/UL (ref 0–0.2)
BUN SERPL-MCNC: 23 MG/DL (ref 6–24)
BUN/CREAT SERPL: 23.7 (ref 8–20)
CALCIUM SERPL-MCNC: 10.1 MG/DL (ref 8.6–10.3)
CHLORIDE SERPL-SCNC: 100 MMOL/L (ref 101–111)
EOSINOPHIL # BLD AUTO: 0.3 10^3/UL (ref 0–0.6)
GLOBULIN SER CALC-MCNC: 3.3 G/DL (ref 2–4)
GLUCOSE SERPL-MCNC: 115 MG/DL (ref 70–100)
HCO3 SERPL-SCNC: 28 MMOL/L (ref 22–32)
HCT VFR BLD AUTO: 45 % (ref 35–47)
HGB BLD-MCNC: 14.3 G/DL (ref 12–16)
LYMPHOCYTES # BLD AUTO: 2.5 10^3/UL (ref 1–4.8)
MAGNESIUM SERPL-MCNC: 1.9 MG/DL (ref 1.9–2.7)
MCH RBC QN AUTO: 27 PG (ref 27–31)
MCHC RBC AUTO-ENTMCNC: 32 G/DL (ref 31–36)
MCV RBC AUTO: 84 FL (ref 80–97)
MONOCYTES # BLD AUTO: 1 10^3/UL (ref 0–0.8)
NEUTROPHILS # BLD AUTO: 9.1 10^3/UL (ref 1.5–7.7)
NRBC # BLD AUTO: 0 10^3/UL
NRBC BLD QL AUTO: 0.1
PLATELET # BLD AUTO: 270 10^3/UL (ref 150–450)
POTASSIUM SERPL-SCNC: 4.7 MMOL/L (ref 3.5–5)
PROT SERPL-MCNC: 7.7 G/DL (ref 6.4–8.9)
RBC # BLD AUTO: 5.31 10^6 /UL (ref 3.7–4.87)
RBC UR QL AUTO: (no result)
SODIUM SERPL-SCNC: 136 MMOL/L (ref 135–145)
WBC # BLD AUTO: 13.1 10^3/UL (ref 3.5–10.8)
WBC UR QL AUTO: (no result)

## 2019-09-18 NOTE — ED
Shortness of Breath





- HPI Summary


HPI Summary: 





Complains of shortness of breath starting this afternoon.  Patient was given 

nebulizer treatment by son prior to arrival of EMS.  Patient O2 sats 95% upon 

arrival of EMS.  Patient brought to the ED for further evaluation.  Patient 

denies active SOB, CP at this time.  Also denies fever, cough, sore throat, CP, 

SOB, N/V/V Bell pain, change in urine, change in BM.  Patient also complains of 

multiple areas of erythema on her feet, left hip and left side face which has 

been diagnosed by urgent care as cellulitis.  Currently taking second round of 

antibiotics without improvement in symptoms.  Medical history is anxiety.





- History of Current Complaint


Chief Complaint: EDRespiratoryDistress


Hx Obtained From: Patient


Onset/Duration: Sudden Onset, Lasting Minutes


Current Severity: None


Dyspnea At: Rest


Aggravating Factors: Nothing


Alleviating Factors: Bronchodilators


Associated Signs & Symptoms: Negative





- Allergy/Home Medications


Allergies/Adverse Reactions: 


 Allergies











Allergy/AdvReac Type Severity Reaction Status Date / Time


 


Penicillins Allergy  Shortness Verified 06/28/19 15:14





   of Breath  














PMH/Surg Hx/FS Hx/Imm Hx


Endocrine/Hematology History: 


   Denies: Hx Diabetes


Cardiovascular History: Reports: Hx Angina


   Denies: Hx Congestive Heart Failure, Hx Coronary Artery Disease, Hx 

Hypercholesterolemia, Hx Hypertension, Hx Myocardial Infarction


Respiratory History: Reports: Hx Chronic Obstructive Pulmonary Disease (COPD), 

Hx Pneumonia, Other Respiratory Problems/Disorders - current SOB with exertion


 History: 


   Denies: Hx Renal Disease


Sensory History: Reports: Hx Cataracts - cataract removal, Hx Contacts or 

Glasses, Hx Macular Degeneration, Hx Hearing Problem


   Denies: Hx Hearing Aid


Opthamlomology History: Reports: Hx Cataracts - cataract removal, Hx Contacts 

or Glasses, Hx Macular Degeneration


EENT History: 


   Denies: Hx Deafness


Neurological History: Reports: Other Neuro Impairments/Disorders - familial 

tremors


   Denies: Hx Dementia


Psychiatric History: Reports: Hx Depression





- Surgical History


Surgery Procedure, Year, and Place: gallbladder removal, appy, hysterectomy





- Immunization History


Date of Tetanus Vaccine: Unknown


Infectious Disease History: No


Infectious Disease History: 


   Denies: Traveled Outside the [place] in Last 30 Days





- Family History


Known Family History: Positive: Other - POS: DM, [place]


   Negative: Hypertension, Diabetes





- Social History


Alcohol Use: Rare


Hx Substance Use: No


Substance Use Type: Reports: None


Hx Tobacco Use: Yes


Smoking Status (MU): Heavy Every Day Tobacco Smoker


Type: Cigarettes


Amount Used/How Often: half pack a day (last smoked on 9/18)


Have You Smoked in the Last Year: Yes





Review of Systems


Constitutional: Negative


Eyes: Negative


ENT: Negative


Cardiovascular: Negative


Positive: Shortness Of Breath


Gastrointestinal: Negative


Genitourinary: Negative


Musculoskeletal: Negative


Skin: Other


Neurological: Negative


Psychological: Normal


All Other Systems Reviewed And Are Negative: Yes





Physical Exam





- Summary


Physical Exam Summary: 





2 or 3 independent lesions of erythema on bilateral ankles.  Tender to 

palpation.  No extra warmth, ecchymosis, swelling or deformity noted to ankles 

or feet.  Calves soft nontender bilaterally.  Patient has one area of raised 

erythema on left hip which appears to be an insect bite.  Patient also 

complains of redness and swelling to left side face which is not visible to 

this provider.  ENT exam was unremarkable.  


Vital Signs On Initial Exam: 


 Initial Vitals











Temp Pulse Resp BP Pulse Ox


 


 97.7 F   98   22   120/72   94 


 


 09/17/19 22:56  09/17/19 22:56  09/17/19 22:56  09/17/19 22:56  09/17/19 22:56














Procedures





- Sedation


Patient Received Moderate/Deep Sedation with Procedure: No





Diagnostics





- Vital Signs


 Vital Signs











  Temp Pulse Resp BP Pulse Ox


 


 09/17/19 23:00   93    94


 


 09/17/19 22:58   95   120/72  94


 


 09/17/19 22:56  97.7 F  98  22  120/72  94














- Laboratory


Result Diagrams: 


 09/18/19 00:29





 09/18/19 00:29


Lab Statement: Any lab studies that have been ordered have been reviewed, and 

results considered in the medical decision making process.





Course/Dx





- Course


Course Of Treatment: Complains of shortness of breath starting this afternoon.  

Patient was given nebulizer treatment by son prior to arrival of EMS.  Patient 

O2 sats 95% upon arrival of EMS.  Patient brought to the ED for further 

evaluation.  Patient denies active SOB, CP at this time.  Also denies fever, 

cough, sore throat, CP, SOB, N/V/V Bell pain, change in urine, change in BM.  

Patient also complains of multiple areas of erythema on her feet, left hip and 

left side face which has been diagnosed by urgent care as cellulitis.  

Currently taking second round of antibiotics without improvement in symptoms.  

Medical history is anxiety.  Patient intermittently mildly tachycardic, mildly 

tachypnea.  In no apparent respiratory distress.  Denies active symptoms.  

Vital signs otherwise within normal limits.  Serial troponins negative.  WBC 

13.1.  Labs otherwise unremarkable.  EKG sinus rhythm, same as prior.  Chest x-

ray unremarkable.  Rash appears inconsistent with cellulitis.  Patient advised 

to follow-up with primary care and dermatology.





- Diagnoses


Provider Diagnoses: 


 COPD exacerbation, Rash








Discharge ED





- Sign-Out/Discharge


Documenting (check all that apply): Patient Departure


Patient Received Moderate/Deep Sedation with Procedure: No





- Discharge Plan


Condition: Stable


Disposition: HOME


Patient Education Materials:  COPD (Chronic Obstructive Pulmonary Disease) (ED)

, Acute Rash (ED)


Referrals: 


Mariela Cobb MD [Primary Care Provider] - 


Additional Instructions: 


Follow-up at your primary care appointment tomorrow for further management of 

rash and potential dermatology evaluation.  Return to the ED for any new or 

worsening symptoms.





- Billing Disposition and Condition


Condition: STABLE


Disposition: Home





- Attestation Statements


Provider Attestation: 





pt seen by midlevel provider independently, based on their assessment, it was 

not necessary to present the case to me but I was available for consultation. I 

did not form a physician-patient relationship with the patient. The chart 

however, has been reviewed. am signing this note strictly in an administrative 

capacity.

## 2020-04-01 ENCOUNTER — HOSPITAL ENCOUNTER (EMERGENCY)
Dept: HOSPITAL 25 - ED | Age: 85
Discharge: HOME | End: 2020-04-01
Payer: MEDICARE

## 2020-04-01 VITALS — SYSTOLIC BLOOD PRESSURE: 145 MMHG | DIASTOLIC BLOOD PRESSURE: 70 MMHG

## 2020-04-01 DIAGNOSIS — Z20.828: ICD-10-CM

## 2020-04-01 DIAGNOSIS — Z79.899: ICD-10-CM

## 2020-04-01 DIAGNOSIS — F41.9: ICD-10-CM

## 2020-04-01 DIAGNOSIS — F17.210: ICD-10-CM

## 2020-04-01 DIAGNOSIS — Z88.0: ICD-10-CM

## 2020-04-01 DIAGNOSIS — J44.9: ICD-10-CM

## 2020-04-01 DIAGNOSIS — R06.02: Primary | ICD-10-CM

## 2020-04-01 DIAGNOSIS — R05: ICD-10-CM

## 2020-04-01 LAB
ALBUMIN SERPL BCG-MCNC: 3.9 G/DL (ref 3.2–5.2)
ALBUMIN/GLOB SERPL: 1.1 {RATIO} (ref 1–3)
ALP SERPL-CCNC: 73 U/L (ref 34–104)
ALT SERPL W P-5'-P-CCNC: 12 U/L (ref 7–52)
ANION GAP SERPL CALC-SCNC: 7 MMOL/L (ref 2–11)
APTT PPP: 33.1 SECONDS (ref 26–38)
AST SERPL-CCNC: 17 U/L (ref 13–39)
BASOPHILS # BLD AUTO: 0 10^3/UL (ref 0–0.2)
BUN SERPL-MCNC: 19 MG/DL (ref 6–24)
BUN/CREAT SERPL: 31.7 (ref 8–20)
CALCIUM SERPL-MCNC: 9.8 MG/DL (ref 8.6–10.3)
CHLORIDE SERPL-SCNC: 104 MMOL/L (ref 101–111)
EOSINOPHIL # BLD AUTO: 0.1 10^3/UL (ref 0–0.6)
GLOBULIN SER CALC-MCNC: 3.5 G/DL (ref 2–4)
GLUCOSE SERPL-MCNC: 114 MG/DL (ref 70–100)
HCO3 SERPL-SCNC: 25 MMOL/L (ref 22–32)
HCT VFR BLD AUTO: 42 % (ref 35–47)
HGB BLD-MCNC: 13.6 G/DL (ref 12–16)
INR PPP/BLD: 1.01 (ref 0.82–1.09)
LYMPHOCYTES # BLD AUTO: 1.6 10^3/UL (ref 1–4.8)
MCH RBC QN AUTO: 29 PG (ref 27–31)
MCHC RBC AUTO-ENTMCNC: 33 G/DL (ref 31–36)
MCV RBC AUTO: 88 FL (ref 80–97)
MONOCYTES # BLD AUTO: 0.6 10^3/UL (ref 0–0.8)
NEUTROPHILS # BLD AUTO: 6.6 10^3/UL (ref 1.5–7.7)
NRBC # BLD AUTO: 0 10^3/UL
NRBC BLD QL AUTO: 0.1
PLATELET # BLD AUTO: 223 10^3/UL (ref 150–450)
POTASSIUM SERPL-SCNC: 4.3 MMOL/L (ref 3.5–5)
PROT SERPL-MCNC: 7.4 G/DL (ref 6.4–8.9)
RBC # BLD AUTO: 4.75 10^6 /UL (ref 3.7–4.87)
SODIUM SERPL-SCNC: 136 MMOL/L (ref 135–145)
TROPONIN I SERPL-MCNC: 0.01 NG/ML (ref ?–0.03)
WBC # BLD AUTO: 8.9 10^3/UL (ref 3.5–10.8)

## 2020-04-01 PROCEDURE — 71045 X-RAY EXAM CHEST 1 VIEW: CPT

## 2020-04-01 PROCEDURE — 85025 COMPLETE CBC W/AUTO DIFF WBC: CPT

## 2020-04-01 PROCEDURE — 83880 ASSAY OF NATRIURETIC PEPTIDE: CPT

## 2020-04-01 PROCEDURE — 99283 EMERGENCY DEPT VISIT LOW MDM: CPT

## 2020-04-01 PROCEDURE — 85610 PROTHROMBIN TIME: CPT

## 2020-04-01 PROCEDURE — 80053 COMPREHEN METABOLIC PANEL: CPT

## 2020-04-01 PROCEDURE — 93005 ELECTROCARDIOGRAM TRACING: CPT

## 2020-04-01 PROCEDURE — 86140 C-REACTIVE PROTEIN: CPT

## 2020-04-01 PROCEDURE — 87635 SARS-COV-2 COVID-19 AMP PRB: CPT

## 2020-04-01 PROCEDURE — 85730 THROMBOPLASTIN TIME PARTIAL: CPT

## 2020-04-01 PROCEDURE — 83605 ASSAY OF LACTIC ACID: CPT

## 2020-04-01 PROCEDURE — 36415 COLL VENOUS BLD VENIPUNCTURE: CPT

## 2020-04-01 PROCEDURE — 84484 ASSAY OF TROPONIN QUANT: CPT

## 2020-04-01 NOTE — ED
Shortness of Breath





- HPI Summary


HPI Summary: 


Pt. is an 87 y.o female who presents to ER for SOB and cough x several days. 

Past hx of COPD. Pt. denies chest pain. Pt. denies fever or sick contacts. Pt. 

denies fever, chills, N.V, abd. pain. Sxs are moderate in severity. No current 

modifying factors.








- History of Current Complaint


Time Seen by Provider: 04/01/20 10:21


Hx Obtained From: Patient





- Allergy/Home Medications


Allergies/Adverse Reactions: 


 Allergies











Allergy/AdvReac Type Severity Reaction Status Date / Time


 


Penicillins Allergy  Shortness Verified 04/01/20 11:34





   of Breath  











Home Medications: 


 Home Medications





Albuterol/Ipratropium NEB.SOL* [Duoneb (Albuterol 2.5 MG/Ipratropium 0.5 MG)] 1 

neb INH .2-3X/DAY PRN 10/18/17 [History Confirmed 04/01/20]


Budesonide/Formote 160/4.5(NF) [Symbicort 160/4.5 (NF)] 2 puff INH BID PRN 10/18

/17 [History Confirmed 04/01/20]


Zolpidem TAB* [Ambien*] 5 mg PO BEDTIME PRN 10/18/17 [History Confirmed 04/01/20

]


Fluconazole 150 MG TAB* [Diflucan 150 MG TAB*] 150 mg PO ONCE 04/01/20 [History 

Confirmed 04/01/20]


Nicotine Lozenge* (NF) [Nicotine Lozenge*] 2 mg PO Q2H PRN 04/01/20 [History 

Confirmed 04/01/20]











PMH/Surg Hx/FS Hx/Imm Hx


Previously Healthy: Yes


Endocrine/Hematology History: 


   Denies: Hx Diabetes


Cardiovascular History: Reports: Hx Angina


   Denies: Hx Congestive Heart Failure, Hx Coronary Artery Disease, Hx 

Hypercholesterolemia, Hx Hypertension, Hx Myocardial Infarction


Respiratory History: Reports: Hx Chronic Obstructive Pulmonary Disease (COPD), 

Hx Pneumonia, Other Respiratory Problems/Disorders - current SOB with exertion


 History: 


   Denies: Hx Renal Disease


Sensory History: Reports: Hx Cataracts - cataract removal, Hx Contacts or 

Glasses, Hx Macular Degeneration, Hx Hearing Problem


   Denies: Hx Deafness, Hx Hearing Aid


Opthamlomology History: Reports: Hx Cataracts - cataract removal, Hx Contacts 

or Glasses, Hx Macular Degeneration


Neurological History: Reports: Other Neuro Impairments/Disorders - familial 

tremors


   Denies: Hx Dementia


Psychiatric History: Reports: Hx Depression





- Surgical History


Surgery Procedure, Year, and Place: gallbladder removal, appy, hysterectomy





- Immunization History


Date of Tetanus Vaccine: Unknown





- Family History


Known Family History: Positive: Other - POS: DM, [place]


   Negative: Hypertension, Diabetes





- Social History


Occupation: Retired


Lives: With Family


Alcohol Use: Rare


Hx Substance Use: No


Substance Use Type: Reports: None


Hx Tobacco Use: Yes


Smoking Status (MU): Heavy Every Day Tobacco Smoker


Type: Cigarettes


Amount Used/How Often: half pack a day (last smoked on 9/18)


Have You Smoked in the Last Year: Yes





Review of Systems


Constitutional: Negative


Negative: Fever


Cardiovascular: Negative


Negative: Chest Pain


Positive: Shortness Of Breath, Cough


Gastrointestinal: Negative


Negative: Abdominal Pain, Vomiting, Diarrhea


Musculoskeletal: Negative


Skin: Negative


Neurological/Mental Status: Negative


All Other Systems Reviewed And Are Negative: Yes





Physical Exam


Triage Information Reviewed: Yes


Vital Signs Reviewed: Yes


Appearance: Positive: Well-Appearing - Pt. sitting up in bed in NAD. Wearing 

mask. Answers questions appropriately. Anxious.


Skin: Positive: Warm, Dry


Head/Face: Positive: Normal Head/Face Inspection


Eyes: Positive: Normal, EOMI


Neck: Positive: Supple


Respiratory/Lung Sounds: Positive: Other - Mild rhonchi on right.


Cardiovascular: Positive: Normal, RRR


Abdomen Description: Positive: Nontender, Soft


Musculoskeletal: Negative: Edema Left, Edema Right


Neurological: Positive: Normal, CN Intact II-III


Psychiatric: Positive: Affect/Mood Appropriate





Procedures





- Sedation


Patient Received Moderate/Deep Sedation with Procedure: No





Diagnostics





- Laboratory


Result Diagrams: 


 04/01/20 11:15





 04/01/20 11:15


Lab Statement: Any lab studies that have been ordered have been reviewed, and 

results considered in the medical decision making process.





Course/Dx





- Course


Course Of Treatment: Pt. with c/o cough and sob. Afebrile. O2 saturation 95% on 

RA. VS stable. Pt. is very anxious. Given cough and SOB. COVID ordered and PPE 

inuse.  Labs are unremarkable. ECG done at 1120 shows a sinus rhythm of 82bpm, 

normal axis, no STEMI. CXR negative for acute findings per radiology. Pt. 

ambulated in room and O2 stayed 95%. Will dc pt. home to f.u with PCP. To 

continue home meds. To return to er if sxs change or worsen. Pending covid. To 

f.u isoloation protocols.





- Diagnoses


Differential Diagnosis/HQI/PQRI: Positive: CHF, COPD Exacerbation, MI, Pneumonia

, SARS


Provider Diagnoses: 


 Shortness of breath, Cough








Discharge ED





- Sign-Out/Discharge


Documenting (check all that apply): Patient Departure





- Discharge Plan


Condition: Good


Disposition: HOME


Patient Education Materials:  Dyspnea (ED), Acute Cough (ED)


Forms:  COVID-19 Tested & Isolation


Referrals: 


Mariela Cobb MD [Medical Doctor] - 


Additional Instructions: 


Please see your PCP within 2-3 days for recheck


Health department will follow up with you for COVID test results


Please follow isolation precautions 


Continue home medication as directed


Return to ER if symptoms change or worsen





- Billing Disposition and Condition


Condition: GOOD


Disposition: Home